# Patient Record
Sex: FEMALE | Race: BLACK OR AFRICAN AMERICAN | NOT HISPANIC OR LATINO | Employment: UNEMPLOYED | URBAN - METROPOLITAN AREA
[De-identification: names, ages, dates, MRNs, and addresses within clinical notes are randomized per-mention and may not be internally consistent; named-entity substitution may affect disease eponyms.]

---

## 2019-10-03 ENCOUNTER — LAB VISIT (OUTPATIENT)
Dept: LAB | Facility: HOSPITAL | Age: 49
End: 2019-10-03
Attending: INTERNAL MEDICINE
Payer: COMMERCIAL

## 2019-10-03 ENCOUNTER — OFFICE VISIT (OUTPATIENT)
Dept: PULMONOLOGY | Facility: CLINIC | Age: 49
End: 2019-10-03
Payer: COMMERCIAL

## 2019-10-03 VITALS
HEART RATE: 94 BPM | SYSTOLIC BLOOD PRESSURE: 160 MMHG | OXYGEN SATURATION: 91 % | HEIGHT: 65 IN | DIASTOLIC BLOOD PRESSURE: 88 MMHG | RESPIRATION RATE: 18 BRPM | BODY MASS INDEX: 39.47 KG/M2 | WEIGHT: 236.88 LBS

## 2019-10-03 DIAGNOSIS — R06.89 DYSPNEA AND RESPIRATORY ABNORMALITIES: Chronic | ICD-10-CM

## 2019-10-03 DIAGNOSIS — R06.00 DYSPNEA AND RESPIRATORY ABNORMALITIES: Primary | Chronic | ICD-10-CM

## 2019-10-03 DIAGNOSIS — G47.33 OSA (OBSTRUCTIVE SLEEP APNEA): Chronic | ICD-10-CM

## 2019-10-03 DIAGNOSIS — R06.00 DYSPNEA AND RESPIRATORY ABNORMALITIES: Chronic | ICD-10-CM

## 2019-10-03 DIAGNOSIS — R06.89 DYSPNEA AND RESPIRATORY ABNORMALITIES: Primary | Chronic | ICD-10-CM

## 2019-10-03 DIAGNOSIS — E66.01 CLASS 2 SEVERE OBESITY DUE TO EXCESS CALORIES WITH SERIOUS COMORBIDITY AND BODY MASS INDEX (BMI) OF 39.0 TO 39.9 IN ADULT: Chronic | ICD-10-CM

## 2019-10-03 DIAGNOSIS — R91.8 MASS OF LEFT LUNG: Chronic | ICD-10-CM

## 2019-10-03 LAB — ERYTHROCYTE [SEDIMENTATION RATE] IN BLOOD BY WESTERGREN METHOD: 5 MM/HR (ref 0–20)

## 2019-10-03 PROCEDURE — 99999 PR PBB SHADOW E&M-NEW PATIENT-LVL III: ICD-10-PCS | Mod: PBBFAC,,, | Performed by: INTERNAL MEDICINE

## 2019-10-03 PROCEDURE — 99205 PR OFFICE/OUTPT VISIT, NEW, LEVL V, 60-74 MIN: ICD-10-PCS | Mod: S$PBB,,, | Performed by: INTERNAL MEDICINE

## 2019-10-03 PROCEDURE — 85651 RBC SED RATE NONAUTOMATED: CPT

## 2019-10-03 PROCEDURE — 99205 OFFICE O/P NEW HI 60 MIN: CPT | Mod: S$PBB,,, | Performed by: INTERNAL MEDICINE

## 2019-10-03 PROCEDURE — 99203 OFFICE O/P NEW LOW 30 MIN: CPT | Mod: PBBFAC | Performed by: INTERNAL MEDICINE

## 2019-10-03 PROCEDURE — 99999 PR PBB SHADOW E&M-NEW PATIENT-LVL III: CPT | Mod: PBBFAC,,, | Performed by: INTERNAL MEDICINE

## 2019-10-03 PROCEDURE — 86140 C-REACTIVE PROTEIN: CPT

## 2019-10-03 PROCEDURE — 86038 ANTINUCLEAR ANTIBODIES: CPT

## 2019-10-03 PROCEDURE — 86255 FLUORESCENT ANTIBODY SCREEN: CPT

## 2019-10-03 RX ORDER — HYDRALAZINE HYDROCHLORIDE 10 MG/1
10 TABLET, FILM COATED ORAL DAILY
COMMUNITY

## 2019-10-03 RX ORDER — FERROUS SULFATE 325(65) MG
325 TABLET ORAL 3 TIMES DAILY
COMMUNITY

## 2019-10-03 RX ORDER — POTASSIUM CHLORIDE 20 MEQ/1
20 TABLET, EXTENDED RELEASE ORAL ONCE
COMMUNITY

## 2019-10-03 RX ORDER — INSULIN GLARGINE 100 [IU]/ML
15 INJECTION, SOLUTION SUBCUTANEOUS 2 TIMES DAILY
COMMUNITY

## 2019-10-03 RX ORDER — ALBUTEROL SULFATE 90 UG/1
2 AEROSOL, METERED RESPIRATORY (INHALATION) EVERY 6 HOURS PRN
COMMUNITY

## 2019-10-03 RX ORDER — ALBUTEROL SULFATE 0.83 MG/ML
2.5 SOLUTION RESPIRATORY (INHALATION) EVERY 6 HOURS PRN
COMMUNITY

## 2019-10-03 RX ORDER — AMLODIPINE BESYLATE 5 MG/1
5 TABLET ORAL DAILY
COMMUNITY

## 2019-10-03 RX ORDER — METOPROLOL SUCCINATE 100 MG/1
100 TABLET, EXTENDED RELEASE ORAL 2 TIMES DAILY
COMMUNITY

## 2019-10-03 RX ORDER — LORATADINE 10 MG/1
10 TABLET ORAL DAILY
COMMUNITY

## 2019-10-03 RX ORDER — METFORMIN HYDROCHLORIDE 850 MG/1
850 TABLET ORAL 2 TIMES DAILY WITH MEALS
COMMUNITY

## 2019-10-03 RX ORDER — FUROSEMIDE 40 MG/1
40 TABLET ORAL 2 TIMES DAILY
COMMUNITY

## 2019-10-03 RX ORDER — MONTELUKAST SODIUM 10 MG/1
10 TABLET ORAL NIGHTLY
COMMUNITY
End: 2020-01-14

## 2019-10-03 RX ORDER — FLUTICASONE FUROATE AND VILANTEROL 100; 25 UG/1; UG/1
1 POWDER RESPIRATORY (INHALATION) DAILY
COMMUNITY
End: 2020-01-14

## 2019-10-03 RX ORDER — TRAZODONE HYDROCHLORIDE 100 MG/1
100 TABLET ORAL NIGHTLY
COMMUNITY

## 2019-10-03 NOTE — PROGRESS NOTES
New patient    Subjective:      Patient ID: Nu Yañez is a 49 y.o. female.    Patient Active Problem List   Diagnosis    Dyspnea and respiratory abnormalities    MARLENA (obstructive sleep apnea)    Mass of left lung    Class 2 severe obesity due to excess calories with serious comorbidity and body mass index (BMI) of 39.0 to 39.9 in adult       Problem list has been reviewed.    she has been referred by Self, Werner for evaluation and management for   Chief Complaint   Patient presents with    Shortness of Breath       Chief Complaint: Shortness of Breath      HPI:      She has a 6 X 3 X 8 CM peripheral cyst in her left lung with associated adenopathy. LN biopsy 12/2018 was reportedly unremarkable. She is under the care of Dr. He ( PULMONOLOGY), and Dr. Figueroa ( SURGERY). She is here for a second opinion.      Patient reports difficulty breathing since last year.     Dyspnea Characteristics:   Exertional Dyspnea   Dyspnea Duration:  Chronic  Dyspnea Severity:  ESTELLE 3  Dyspnea Timing:  Exertional   Dyspnea Contributing Factors:  Exertional    Dyspnea Associated Symptoms:   Wheezing       Modified Estelle Dyspnea Scale      0  Nothing at all    0.5  Very, very slight (just noticeable)    1  Very slight    2  Slight    3  Moderate    4 Somewhat severe    5 Severe      6    7  Very severe    8    9   Very, very severe (almost maximal)  10  Maximal     She reports intermittent non productive cough. Patient denies recent sick contacts. Her current respiratory therapy regimen is ASTHMA, PROAIR, ACCUNEB  which provides  relief. She is adherent with her regimen.  Patient does not have new pets. Patient does not have a personal history of asthma but has a significant family history of asthma. Patient does have a history of environmental allergens. No recent change in breathing. Patient has not traveled recently. Patient does not have a history of smoking. Patient has had a previous chest x-ray. Patient has  had a PPD done.  PPD was Negative      Snoring / Sleep Apnea:   Patient has observed tossing/turning, snoring.   Patient reports non restful' sleep.  she denies morning headache.   shereports impairment of activity during wakefulness.  she denies day time napping   [place] sleepiness score was 10.  Neck circumference is 16.  she reports recent weight gain.  Mallampati score 4  Cardiovascular risk factors: diabetes, hypertension, CHF and obesity  Bed time is 2100  Wake time is 0400  Sleep onset is within  30 Minutes.  Sleep maintenance difficulties related to early morning awakening and frequent night time awakening  Wake after sleep onset occurs two times a night.  Nocturia occurs two times a night,   Sleep aids : Yes - TRAZODONE, MELATONIN  Dry mouth : No,   Sleep walking: No,   Sleep talking : No,   Sleep eating:No  Vivid Dreams : No,   Cataplexy : No,   Hypnogogic hallucinations:  No    COMORBIDITIES:  BP Readings from Last 3 Encounters:   10/03/19 (!) 160/88           Spring Questionnaire (validated MARLENA screening questionnaire)  Positive-- Snoring/apnea  Positive -- Fatigue    Body mass index is 39.42 kg/m².  (>25 is overweight, >30 is obese)  Blood Pressure = stage 1    (PreHTN 120-139/80-89, Stg1 140-159/90-99, Stg2 >160/>100)  Spring = Three of three MARLENA categories are positive (high risk is 2-3 positive categories)     [place] Sleepiness Scale   EPWORTH SLEEPINESS SCALE 10/3/2019   Sitting and reading 2   Watching TV 2   Sitting, inactive in a public place (e.g. a theatre or a meeting) 2   As a passenger in a car for an hour without a break 0   Lying down to rest in the afternoon when circumstances permit 2   Sitting and talking to someone 0   Sitting quietly after a lunch without alcohol 2   In a car, while stopped for a few minutes in traffic 0   Total score 10       Reference: Lidia CLAIRE. A new method for measuring daytime sleepiness: The [place]  Sleepiness Scale. Sleep 1991; 14(6):540-5.    STOP-Bang  Questionnaire (validated MARLENA screening questionnaire)  Negative unless checked off.  [x] Snoring    [x]  Tired/Fatigued/Sleepy  [] Obstruction (apneas/choking)  [x] Pressure (HTN)  [x] BMI >35  [] Age >50  [] Neck >40 cm  [] Gender male   STOP-Bang = 4 (low risk 0-2,high risk 3-8)    References:   STOP Questionnaire   A Tool to Screen Patients for Obstructive Sleep Apnea: LAURA RicardoC.P.C., SENTHIL Choi.B.B.S., Cathy Perry M.D.,Adeline Jj, Ph.D., TL HerndonB.B.S.,_ Stanley Bosch.,_ Yusuf Sparrow M.D., PINO Rasmussen.R.C.P.C.; Anesthesiology 2008; 108:812-21 Copyright © 2008, the American Society of Anesthesiologists, Inc. [employer]      Neck circumference 41 cm [?MARLENA risk if >43cm (17in) male or >41cm (15.5 in) female]    Sleep position Supine = rare    Non-supine = frequent  Mouth breathing during sleep - possibly     Previous Report Reviewed: lab reports, office notes and radiology reports     Past Medical History: The following portions of the patient's history were reviewed and updated as appropriate:   She  has a past surgical history that includes Tubal ligation.  Her family history is not on file.  She  reports that she has never smoked. She has never used smokeless tobacco. She reports that she drank alcohol. She reports that she does not use drugs.  She has a current medication list which includes the following prescription(s): albuterol, albuterol, amlodipine, ferrous sulfate, fluticasone furoate-vilanterol, furosemide, hydralazine, insulin glargine, loratadine, metformin, metoprolol succinate, montelukast, potassium chloride sa, and trazodone.  She has No Known Allergies..    Review of Systems   Constitutional: Positive for night sweats. Negative for chills and fatigue.   Respiratory: Positive for apnea, snoring, cough and somnolence.           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    Avocational Exposures:  none    Pet Exposures:  none      Objective:     Vitals:    10/03/19 1010   BP: (!) 160/88   Pulse: 94   Resp: 18   SpO2: (!) 91%   Weight: 107.5 kg (236 lb 14.2 oz)   Height: 5' 5" (1.651 m)     Body mass index is 39.42 kg/m².    Physical Exam   Constitutional: She is oriented to person, place, and time. She appears well-developed and well-nourished.   HENT:   Head: Normocephalic and atraumatic.   Eyes: Pupils are equal, round, and reactive to light. EOM are normal.   Neck: Normal range of motion. Neck supple.   Cardiovascular: Normal rate and regular rhythm.   Pulmonary/Chest: Effort normal and breath sounds normal.   Abdominal: Soft. Bowel sounds are normal.   Musculoskeletal: Normal range of motion. She exhibits no edema.   Neurological: She is alert and oriented to person, place, and time.   Skin: Skin is warm and dry. Capillary refill takes less than 2 seconds.   Psychiatric: She has a normal mood and affect.   Nursing note and vitals reviewed.      Personal Diagnostic Review    MRI chest with and without contrast : 09/10/19:  There is a persistent well marginated "mass" at the posteromedial left chest. The lesion is located along the pleural surface immediately lateral to the distal aortic arch and proximal descending thoracic aorta and immediately posterior to the left distal pulmonary artery. The well marginated lesion is homogeneously hyperintense on T1 and T2-weighted imaging and measures 6.8 x 3 x 8.2 cm in greatest AP, TV, and CC dimensions. This lesion is not non-enhancing and appreciably changed since the prior chest CT exam, consistent with a benign fluid collection with internal proteinaceous fluid and/or less likely chronic hemorrhage. There is no peripheral rind of enhancement to suggest superimposed inflammation or infection. The previously demonstrated small right pleural effusion has resolved. There is no left pleural " effusion. There is persistent mediastinal and more mild bilateral hilar adenopathy. A right upper paratracheal node measures 3.2 x 1.9 cm on axial T1 post contrast image 35, series 11. There are several additional mildly enlarged bilateral paratracheal lymph nodes and a mildly enlarged subcarinal lymph node. Mild cardiomegaly without pericardial effusion. There is enlargement of the main pulmonary artery is again demonstrated, measuring approximately 35 mm in diameter and suggesting pulmonary arterial hypertension. Obesity without acute chest wall abnormality. Small and shotty bilateral axillary nodes with no axillary adenopathy or chest wall mass. There is partially visualized diffuse hepatic steatosis without acute upper abdominal pathology identified. The spleen is normal in size. No significant skeletal abnormality.         Assessment /Plan:     Discussed diagnosis, its evaluation, treatment and usual course. All questions answered.    Problem List Items Addressed This Visit        Pulmonary    Mass of left lung    Current Assessment & Plan     6 X 3 X 8 fluid filled cystic mass.  CT chest non contrast to evaluate.             Other    Class 2 severe obesity due to excess calories with serious comorbidity and body mass index (BMI) of 39.0 to 39.9 in adult (Chronic)    Current Assessment & Plan     General weight loss/lifestyle modification strategies discussed (elicit support from others; identify saboteurs; non-food rewards, etc).  Behavioral treatment: Slim Fast.  Diet interventions: low calorie (1000 kCal/d) deficit diet.  Informal exercise measures discussed, e.g. taking stairs instead of elevator.  Regular aerobic exercise program discussed.         Dyspnea and respiratory abnormalities - Primary    Current Assessment & Plan     Etiology unclear.  Multifactorial etiology suspected.  Likely contributors to etiology (checked)    [x] Pulmonary airway disease    [x]  Pulmonary parenchymal disease  [x] Pulmonary  vascular disease   [x] Pleural disease  [x] Pulmonary vasculitis  [x] Hypoventilation ( chest wall deformity, neuromuscular disease, obesity etc)  [] Anemia  [x] Thyroid disease.  [x] Cardiac illess  [x]         Sleep disorder    EVALUATION:  [x]        Complete PFT with bronchodilator.  []        Bronchial challenge with methacholine.   [x]        Stress test, pulmonary.  [x]        PULM - Arterial Blood Gases  []        Chest X Ray  [x]        CT scan of chest.   [x]        JORGE  [x]        Sedimentation rate  [x]        C-reactive protein  [x]        Anti-neutrophilic cytoplasmic antibody          PLAN:  Discussed diagnosis, its evaluation, treatment and usual course.  All questions answered.        Call if shortness of breath worsens, blood in sputum, change in character of cough, development of fever or chills, inability to maintain nutrition and hydration.     Re evaluate in four weeks with results.           Relevant Orders    JORGE Screen w/Reflex    C-reactive protein    Sedimentation rate    Anti-neutrophilic cytoplasmic antibody    Complete PFT with bronchodilator    PULM - Arterial Blood Gases--in addition to PFT only    Pulmonary stress test    CT Chest Without Contrast    MARLENA (obstructive sleep apnea)    Current Assessment & Plan     My recommendation at this point would be to set up a [facility].  We have discussed weight loss and how this may improve her situation.  We have discussed behavioral modifications, as well.  After her study, she  could certainly try a CPAP.                    TIME SPENT WITH PATIENT: Time spent: 60 minutes in face to face  discussion concerning diagnosis, prognosis, review of lab and test results, benefits of treatment as well as management of disease, counseling of patient and coordination of care between various health  care providers . Greater than half the time spent was used for coordination of care and counseling of patient.      Follow up in about 1 month (around 11/3/2019) for Lung Mass, Obesity, Shortness of breath.

## 2019-10-03 NOTE — ASSESSMENT & PLAN NOTE
Etiology unclear.  Multifactorial etiology suspected.  Likely contributors to etiology (checked)    [x] Pulmonary airway disease    [x]  Pulmonary parenchymal disease  [x] Pulmonary vascular disease   [x] Pleural disease  [x] Pulmonary vasculitis  [x] Hypoventilation ( chest wall deformity, neuromuscular disease, obesity etc)  [] Anemia  [x] Thyroid disease.  [x] Cardiac illess  [x]         Sleep disorder    EVALUATION:  [x]        Complete PFT with bronchodilator.  []        Bronchial challenge with methacholine.   [x]        Stress test, pulmonary.  [x]        PULM - Arterial Blood Gases  []        Chest X Ray  [x]        CT scan of chest.   [x]        JORGE  [x]        Sedimentation rate  [x]        C-reactive protein  [x]        Anti-neutrophilic cytoplasmic antibody          PLAN:  Discussed diagnosis, its evaluation, treatment and usual course.  All questions answered.        Call if shortness of breath worsens, blood in sputum, change in character of cough, development of fever or chills, inability to maintain nutrition and hydration.     Re evaluate in four weeks with results.

## 2019-10-03 NOTE — PATIENT INSTRUCTIONS
Lung Anatomy  Your lungs take air in to give your body oxygen, which the body needs to work. Your lungs, like all the tissues in your body, are made up of billions of tiny specialized cells. Old lung cells die and are replaced by new, identical lung cells. This natural process helps ensure healthy lungs.    Date Last Reviewed: 11/1/2016  © 9802-6640 [employer]. [address] [place] [address], [place] [address]. All rights reserved. This information is not intended as a substitute for professional medical care. Always follow your healthcare professional's instructions.

## 2019-10-04 LAB
ANA SER QL IF: NORMAL
CRP SERPL-MCNC: 9.2 MG/L (ref 0–8.2)

## 2019-10-07 LAB
ANCA AB TITR SER IF: NORMAL TITER
P-ANCA TITR SER IF: NORMAL TITER

## 2019-10-15 ENCOUNTER — TELEPHONE (OUTPATIENT)
Dept: PULMONOLOGY | Facility: CLINIC | Age: 49
End: 2019-10-15

## 2019-10-15 NOTE — TELEPHONE ENCOUNTER
----- Message from Nadeen Vadim sent at 10/15/2019 10:27 AM CDT -----  Contact: pt  States she [facility]. Fax# [phone], attn [employer]. She is there now and she needs to get it faxed as soon as possible. Please call pt at [phone]. Thank you

## 2019-10-24 ENCOUNTER — TELEPHONE (OUTPATIENT)
Dept: PULMONOLOGY | Facility: CLINIC | Age: 49
End: 2019-10-24

## 2019-10-24 NOTE — TELEPHONE ENCOUNTER
----- Message from Luma Hamilton RRT sent at 10/24/2019  8:25 AM CDT -----  Contact: self  Forwarding to you to reschedule bc patient has CT Scan to reschedule also.      ----- Message -----  From: Julia Hwang  Sent: 10/23/2019   5:21 PM CDT  To: [facility] Clinical Staff    Pt called to speak with dept to jason appt.          Pt callback number [phone]

## 2019-11-26 ENCOUNTER — TELEPHONE (OUTPATIENT)
Dept: PULMONOLOGY | Facility: CLINIC | Age: 49
End: 2019-11-26

## 2019-11-26 NOTE — TELEPHONE ENCOUNTER
----- Message from Karmen Sawyer sent at 11/26/2019  1:47 PM CST -----  Contact: PATIENT  CALLING CONCERNING RESCHEDULING HER APPOINTMENTS. PLEASE CALL PATIENT @ [phone]. THANKS, LEANDRO

## 2020-01-14 ENCOUNTER — HOSPITAL ENCOUNTER (OUTPATIENT)
Dept: RADIOLOGY | Facility: HOSPITAL | Age: 50
Discharge: HOME | End: 2020-01-14
Attending: INTERNAL MEDICINE
Payer: COMMERCIAL

## 2020-01-14 ENCOUNTER — OFFICE VISIT (OUTPATIENT)
Dept: PULMONOLOGY | Facility: CLINIC | Age: 50
End: 2020-01-14
Payer: COMMERCIAL

## 2020-01-14 ENCOUNTER — CLINICAL SUPPORT (OUTPATIENT)
Dept: PULMONOLOGY | Facility: CLINIC | Age: 50
End: 2020-01-14
Payer: COMMERCIAL

## 2020-01-14 VITALS
HEART RATE: 70 BPM | WEIGHT: 242 LBS | HEIGHT: 65 IN | OXYGEN SATURATION: 100 % | RESPIRATION RATE: 18 BRPM | DIASTOLIC BLOOD PRESSURE: 80 MMHG | SYSTOLIC BLOOD PRESSURE: 142 MMHG | BODY MASS INDEX: 40.32 KG/M2

## 2020-01-14 VITALS — BODY MASS INDEX: 40.4 KG/M2 | HEIGHT: 65 IN | WEIGHT: 242.5 LBS

## 2020-01-14 DIAGNOSIS — E66.01 CLASS 2 SEVERE OBESITY DUE TO EXCESS CALORIES WITH SERIOUS COMORBIDITY AND BODY MASS INDEX (BMI) OF 39.0 TO 39.9 IN ADULT: Chronic | ICD-10-CM

## 2020-01-14 DIAGNOSIS — R06.89 DYSPNEA AND RESPIRATORY ABNORMALITIES: Chronic | ICD-10-CM

## 2020-01-14 DIAGNOSIS — R06.00 DYSPNEA AND RESPIRATORY ABNORMALITIES: Chronic | ICD-10-CM

## 2020-01-14 DIAGNOSIS — G47.33 OSA (OBSTRUCTIVE SLEEP APNEA): Chronic | ICD-10-CM

## 2020-01-14 DIAGNOSIS — R91.8 MASS OF LEFT LUNG: Primary | Chronic | ICD-10-CM

## 2020-01-14 DIAGNOSIS — R06.89 DYSPNEA AND RESPIRATORY ABNORMALITIES: ICD-10-CM

## 2020-01-14 DIAGNOSIS — R06.00 DYSPNEA AND RESPIRATORY ABNORMALITIES: ICD-10-CM

## 2020-01-14 DIAGNOSIS — J96.11 CHRONIC RESPIRATORY FAILURE WITH HYPOXIA: Chronic | ICD-10-CM

## 2020-01-14 DIAGNOSIS — J98.4 RESTRICTIVE LUNG DISEASE: ICD-10-CM

## 2020-01-14 LAB
ALLENS TEST: ABNORMAL
BRPFT: ABNORMAL
DELSYS: ABNORMAL
DLCO ADJ PRE: 10.59 ML/(MIN*MMHG) (ref 19.07–30.54)
DLCO SINGLE BREATH LLN: 19.07
DLCO SINGLE BREATH PRE REF: 42.7 %
DLCO SINGLE BREATH REF: 24.8
DLCOC SBVA LLN: 3.4
DLCOC SBVA PRE REF: 108.5 %
DLCOC SBVA REF: 4.86
DLCOC SINGLE BREATH LLN: 19.07
DLCOC SINGLE BREATH PRE REF: 42.7 %
DLCOC SINGLE BREATH REF: 24.8
DLCOVA LLN: 3.4
DLCOVA PRE REF: 108.5 %
DLCOVA PRE: 5.28 ML/(MIN*MMHG*L) (ref 3.4–6.33)
DLCOVA REF: 4.86
DLVAADJ PRE: 5.28 ML/(MIN*MMHG*L) (ref 3.4–6.33)
ERV LLN: 0.96
ERV PRE REF: 14.9 %
ERV REF: 0.96
FEF 25 75 CHG: -14 %
FEF 25 75 LLN: 1.18
FEF 25 75 POST REF: 63.8 %
FEF 25 75 PRE REF: 74.2 %
FEF 25 75 REF: 2.44
FET100 CHG: -3.9 %
FEV1 CHG: -6.5 %
FEV1 FVC CHG: 0.1 %
FEV1 FVC LLN: 70
FEV1 FVC POST REF: 106.7 %
FEV1 FVC PRE REF: 106.7 %
FEV1 FVC REF: 81
FEV1 LLN: 1.85
FEV1 POST REF: 44.5 %
FEV1 PRE REF: 47.6 %
FEV1 REF: 2.45
FIO2: 0.21
FRCPLETH LLN: 1.92
FRCPLETH PREREF: 43.2 %
FRCPLETH REF: 2.75
FVC CHG: -6.6 %
FVC LLN: 2.32
FVC POST REF: 41.5 %
FVC PRE REF: 44.4 %
FVC REF: 3.04
HCO3 UR-SCNC: 23.6 MMOL/L (ref 24–28)
IVC PRE: 1.31 L (ref 2.32–3.77)
IVC SINGLE BREATH LLN: 2.32
IVC SINGLE BREATH PRE REF: 43 %
IVC SINGLE BREATH REF: 3.04
MODE: ABNORMAL
MVV LLN: 91
MVV PRE REF: 47.8 %
MVV REF: 107
PCO2 BLDA: 34.4 MMHG (ref 35–45)
PEF CHG: 10 %
PEF LLN: 4.28
PEF POST REF: 91.6 %
PEF PRE REF: 83.3 %
PEF REF: 6.35
PH SMN: 7.44 [PH] (ref 7.35–7.45)
PO2 BLDA: 61 MMHG (ref 80–100)
POC BE: -1 MMOL/L
POC SATURATED O2: 92 % (ref 95–100)
POST FEF 25 75: 1.56 L/S (ref 1.18–3.7)
POST FET 100: 7.04 SEC
POST FEV1 FVC: 86.23 % (ref 69.94–91.63)
POST FEV1: 1.09 L (ref 1.85–3.05)
POST FVC: 1.26 L (ref 2.32–3.77)
POST PEF: 5.82 L/S (ref 4.28–8.41)
PRE DLCO: 10.59 ML/(MIN*MMHG) (ref 19.07–30.54)
PRE ERV: 0.14 L (ref 0.96–0.96)
PRE FEF 25 75: 1.81 L/S (ref 1.18–3.7)
PRE FET 100: 7.33 SEC
PRE FEV1 FVC: 86.18 % (ref 69.94–91.63)
PRE FEV1: 1.17 L (ref 1.85–3.05)
PRE FRC PL: 1.19 L
PRE FVC: 1.35 L (ref 2.32–3.77)
PRE MVV: 51 L/MIN (ref 90.69–122.7)
PRE PEF: 5.29 L/S (ref 4.28–8.41)
PRE RV: 1 L (ref 1.21–2.36)
PRE TLC: 2.35 L (ref 4.11–6.09)
RAW LLN: 3.06
RAW PRE REF: 92.7 %
RAW PRE: 2.84 CMH2O*S/L (ref 3.06–3.06)
RAW REF: 3.06
RV LLN: 1.21
RV PRE REF: 56.1 %
RV REF: 1.79
RVTLC LLN: 26
RVTLC PRE REF: 118.3 %
RVTLC PRE: 42.55 % (ref 26.37–45.55)
RVTLC REF: 36
SAMPLE: ABNORMAL
SITE: ABNORMAL
TLC LLN: 4.11
TLC PRE REF: 46.2 %
TLC REF: 5.1
VA PRE: 2.01 L (ref 4.95–4.95)
VA SINGLE BREATH LLN: 4.95
VA SINGLE BREATH PRE REF: 40.5 %
VA SINGLE BREATH REF: 4.95
VC LLN: 2.32
VC PRE REF: 44.4 %
VC PRE: 1.35 L (ref 2.32–3.77)
VC REF: 3.04
VTGRAWPRE: 1.71 L

## 2020-01-14 PROCEDURE — 71250 CT THORAX DX C-: CPT | Mod: TC

## 2020-01-14 PROCEDURE — 99999 PR PBB SHADOW E&M-EST. PATIENT-LVL III: CPT | Mod: PBBFAC,,, | Performed by: INTERNAL MEDICINE

## 2020-01-14 PROCEDURE — 99213 OFFICE O/P EST LOW 20 MIN: CPT | Mod: PBBFAC,25,27 | Performed by: INTERNAL MEDICINE

## 2020-01-14 PROCEDURE — 94729 DIFFUSING CAPACITY: CPT | Mod: 26,S$PBB,, | Performed by: INTERNAL MEDICINE

## 2020-01-14 PROCEDURE — 94060 EVALUATION OF WHEEZING: CPT | Mod: PBBFAC

## 2020-01-14 PROCEDURE — 94060 EVALUATION OF WHEEZING: CPT | Mod: 26,S$PBB,, | Performed by: INTERNAL MEDICINE

## 2020-01-14 PROCEDURE — 94618 PULMONARY STRESS TESTING: CPT | Mod: PBBFAC

## 2020-01-14 PROCEDURE — 94618 PULMONARY STRESS TESTING: ICD-10-PCS | Mod: 26,S$PBB,, | Performed by: INTERNAL MEDICINE

## 2020-01-14 PROCEDURE — 99215 PR OFFICE/OUTPT VISIT, EST, LEVL V, 40-54 MIN: ICD-10-PCS | Mod: 25,S$PBB,, | Performed by: INTERNAL MEDICINE

## 2020-01-14 PROCEDURE — 36600 WITHDRAWAL OF ARTERIAL BLOOD: CPT | Mod: PBBFAC

## 2020-01-14 PROCEDURE — 94618 PULMONARY STRESS TESTING: CPT | Mod: 26,S$PBB,, | Performed by: INTERNAL MEDICINE

## 2020-01-14 PROCEDURE — 94726 PULM FUNCT TST PLETHYSMOGRAP: ICD-10-PCS | Mod: 26,S$PBB,, | Performed by: INTERNAL MEDICINE

## 2020-01-14 PROCEDURE — 99999 PR PBB SHADOW E&M-EST. PATIENT-LVL I: ICD-10-PCS | Mod: PBBFAC,,,

## 2020-01-14 PROCEDURE — 82803 BLOOD GASES ANY COMBINATION: CPT | Mod: PBBFAC

## 2020-01-14 PROCEDURE — 94726 PLETHYSMOGRAPHY LUNG VOLUMES: CPT | Mod: 26,S$PBB,, | Performed by: INTERNAL MEDICINE

## 2020-01-14 PROCEDURE — 99211 OFF/OP EST MAY X REQ PHY/QHP: CPT | Mod: PBBFAC,25

## 2020-01-14 PROCEDURE — 99999 PR PBB SHADOW E&M-EST. PATIENT-LVL III: ICD-10-PCS | Mod: PBBFAC,,, | Performed by: INTERNAL MEDICINE

## 2020-01-14 PROCEDURE — 99999 PR PBB SHADOW E&M-EST. PATIENT-LVL I: CPT | Mod: PBBFAC,,,

## 2020-01-14 PROCEDURE — 94729 DIFFUSING CAPACITY: CPT | Mod: PBBFAC

## 2020-01-14 PROCEDURE — 94726 PLETHYSMOGRAPHY LUNG VOLUMES: CPT | Mod: PBBFAC

## 2020-01-14 PROCEDURE — 36600 WITHDRAWAL OF ARTERIAL BLOOD: CPT | Mod: 59,S$PBB,, | Performed by: INTERNAL MEDICINE

## 2020-01-14 PROCEDURE — 94060 PR EVAL OF BRONCHOSPASM: ICD-10-PCS | Mod: 26,S$PBB,, | Performed by: INTERNAL MEDICINE

## 2020-01-14 PROCEDURE — 99215 OFFICE O/P EST HI 40 MIN: CPT | Mod: 25,S$PBB,, | Performed by: INTERNAL MEDICINE

## 2020-01-14 PROCEDURE — 36600 PR WITHDRAWAL OF ARTERIAL BLOOD: ICD-10-PCS | Mod: 59,S$PBB,, | Performed by: INTERNAL MEDICINE

## 2020-01-14 PROCEDURE — 99211 OFF/OP EST MAY X REQ PHY/QHP: CPT | Mod: PBBFAC,25,27

## 2020-01-14 PROCEDURE — 94729 PR C02/MEMBANE DIFFUSE CAPACITY: ICD-10-PCS | Mod: 26,S$PBB,, | Performed by: INTERNAL MEDICINE

## 2020-01-14 NOTE — PROGRESS NOTES
Patient does not want to take overnight today. Patient lives to far and can not return it tomorrow.

## 2020-01-14 NOTE — ASSESSMENT & PLAN NOTE
Start oxygen supplementation at 4 L /min with exertion.     ONSAT on room air.   2D ECHO with color flow doppler and bubble contrast.

## 2020-01-14 NOTE — PROCEDURES
"[facility]  Six Minute Walk     SUMMARY     Ordering Provider: Dr. Crump   Interpreting Provider: Dr. Crump  Performing nurse/tech/RT: DEBORAH Reeves  Diagnosis: (Dyspnea and Respiratory abnormalities)  Height: 5' 5" (165.1 cm)  Weight: 110 kg (242 lb 8.1 oz)  BMI (Calculated): 40.4   Patient Race:             Phase Oxygen Assessment Supplemental O2 Heart   Rate Blood Pressure Estelle Dyspnea Scale Rating   Resting 95 % Room Air 76 bpm 178/88 0   Exercise        Minute        1 90 % Room Air 87 bpm     2 83 % Room Air 87 bpm     3 92 % 2 L/M 86 bpm     4 90 % 2 L/M 85 bpm     5 86 % 2 L/M 88 bpm     6  85 % 3 L/M 88 bpm 168/83 2   Recovery        Minute        1 89 % 4 L/M 84 bpm     2 96 % 4 L/M 76 bpm     3 100 % 4 L/M 73 bpm     4 100 % 4 L/M 70 bpm 160/79 1     Six Minute Walk Summary  6MWT Status: completed without stopping  Patient Reported: No complaints     Interpretation:  Did the patient stop or pause?: No                                         Total Time Walked (Calculated): 360 seconds  Final Partial Lap Distance (feet): 0 feet  Total Distance Meters (Calculated): 182.88 meters  Predicted Distance Meters (Calculated): 474.46 meters  Percentage of Predicted (Calculated): 38.54  Peak VO2 (Calculated): 9.47  Mets: 2.71  Has The Patient Had a Previous Six Minute Walk Test?: No       Previous 6MWT Results  Has The Patient Had a Previous Six Minute Walk Test?: No        PHYSICIAN INTERPRETATION:      Six minute walk distance is 182.88 / 474.46 meters (38.54 % predicted) with very light dyspnea.   Peak VO2 during walking was 9.47  Ml/min/kg [ 2.71 METS].   During exercise, there was significant desaturation while breathing room air to 83%.   Oxygen saturation improved to 100% with oxygen supplementation at 4  L /min.  Both blood pressure and heart rate remained stable with walking.  Hypertension was present prior to exercise.  This may represent an abnormal cardiovascular " response to exercise.  The patient did not report non-pulmonary symptoms during exercise.  Severe exercise impairment is likely due to desaturation and cardiovascular causes.  The patient did complete the study, walking 360 seconds of the 360 second test.  The patient may benefit from using supplemental oxygen.  No previous study performed.  Based upon age and body mass index, exercise capacity is less than predicted.

## 2020-01-14 NOTE — ASSESSMENT & PLAN NOTE
Etiology unclear.  Multifactorial etiology suspected.  Likely contributors to etiology (checked)    [] Pulmonary airway disease    []  Pulmonary parenchymal disease ( sarcoidosis)  [] Pulmonary vascular disease   [] Pleural disease  [] Pulmonary vasculitis  [x] Hypoventilation ( chest wall deformity, neuromuscular disease, obesity etc)  [] Anemia  [] Thyroid disease.  [x] Cardiac illess  []         Sleep disorder  [x]         Body Habitus (Obesity)    CRLD ROS: no significant ongoing wheezing or shortness of breath.   New concerns: None.   Exam: appears well, vitals normal, no respiratory distress, acyanotic, normal RR.   Assessment:  CRLD needs further observation.   Plan: Continue PROAIR, PROVENTIL. Stop BREO, and SINGULAR.

## 2020-01-14 NOTE — ASSESSMENT & PLAN NOTE
6cm X 3cm X 8cm fluid filled cystic mass: Etiology unclear.  CT chest non contrast reveals associated extensive mediastinal lymphadenopathy.     NM PET scan     Ambulatory referral to CT surgery.

## 2020-01-14 NOTE — PROGRESS NOTES
Subjective:      Established patient    Patient ID: Nu Yañez is a 49 y.o. female.  Patient Active Problem List   Diagnosis    MARLENA (obstructive sleep apnea)    Mass of left lung    Class 2 severe obesity due to excess calories with serious comorbidity and body mass index (BMI) of 39.0 to 39.9 in adult    Restrictive lung disease    Chronic respiratory failure with hypoxia       Problem list has been reviewed.    Chief Complaint: Shortness of Breath and restrictive lung disease      HPI    CT Chest, PFT, 6 MWT and ABG reviewed with patient who expressed and voiced understanding.   All questions were answered to the patients satisfaction.    She has a 6 X 3 X 8 CM peripheral cyst in her left lung with associated adenopathy. LN biopsy 12/2018 was reportedly unremarkable. She is under the care of Dr. He ( PULMONOLOGY), and Dr. Figueroa ( SURGERY).     Patients reports STABLE NYHA II dyspnea    The patient does not have currently have symptoms of  an exacerbation.        Her current respiratory therapy regimen is PROAIR, PROVENTIL, BREO and SINGULAIR which provides relief. She is  adherent with her regimen. No recent change in breathing.      A full  review of systems, past , family  and social histories was performed except as mentioned in the note above, these are non contributory to the main issues discussed today.       Previous Report Reviewed: lab reports, office notes and radiology reports     The following portions of the patient's history were reviewed and updated as appropriate: She  has a past medical history of Diabetes and Hypertension.  She  has a past surgical history that includes Tubal ligation.  Her family history is not on file.  She  reports that she has never smoked. She has never used smokeless tobacco. She reports that she drank alcohol. She reports that she does not use drugs.  She has a current medication list which includes the following prescription(s): albuterol, albuterol,  "amlodipine, ferrous sulfate, furosemide, hydralazine, insulin glargine, loratadine, metformin, metoprolol succinate, potassium chloride sa, and trazodone.  She has No Known Allergies..    Review of Systems   Constitutional: Positive for night sweats. Negative for chills and fatigue.   Respiratory: Positive for apnea, snoring, cough and somnolence.         Objective:     BP (!) 142/80   Pulse 70   Resp 18   Ht 5' 5" (1.651 m)   Wt 109.8 kg (242 lb)   SpO2 100% Comment: 4 L  BMI 40.27 kg/m²   Body mass index is 40.27 kg/m².     Physical Exam   Constitutional: She is oriented to person, place, and time. She appears well-developed and well-nourished.   HENT:   Head: Normocephalic and atraumatic.   Eyes: Pupils are equal, round, and reactive to light. EOM are normal.   Neck: Normal range of motion. Neck supple.   Cardiovascular: Normal rate and regular rhythm.   Pulmonary/Chest: Effort normal and breath sounds normal.   Abdominal: Soft. Bowel sounds are normal.   Musculoskeletal: Normal range of motion. She exhibits no edema.   Neurological: She is alert and oriented to person, place, and time.   Skin: Skin is warm and dry. Capillary refill takes less than 2 seconds.   Psychiatric: She has a normal mood and affect.   Nursing note and vitals reviewed.      Personal Diagnostic Review     ABG 20  1115   PH 7.443   PCO2 34.4*   PO2 61*   HCO3 23.6*   POCSATURATED 92*   BE -1      Patient is alkalemic.  Primary respiratory alkalosis.  Secondary metabolic acidosis.  Moderate hypoxemia. A-a gradient (alveolar-arterial gradient; AaG) elevated: [ 47.5 mmHg ]    Pulmonary function tests:20: FEV1:1.17  (47.6 % predicted), FVC:  1.35 (44.4 % predicted), FEV1/FVC:  86, T.36 (46.2 % predicted), RV/TLVC: 43 (118.3 % predicted), DLCO: 10.59 (42.7 % predicted)  Baseline spirometry reveals normal airflow. Airflow is not clearly improved after bronchodilator. Clinical improvement following bronchodilator therapy may " occur in the absence of spirometric improvement. Static lung volumes reveal sever restriction. Unadjusted diffusion capcity is moderately reduced. This interpretation of diffusing capacity does not take into account the patient's hemoglobin level (Unavailable at the time of testing - hemoglobin assumed to be normal). Diffsuion capcity is normal when adjusted for alveolar volume. Correlate clinically.     Six Minute Walk Test: 01/14.20:   Six minute walk distance is 182.88 / 474.46 meters (38.54 % predicted) with very light dyspnea.   Peak VO2 during walking was 9.47  Ml/min/kg [ 2.71 METS].   During exercise, there was significant desaturation while breathing room air to 83%.   Oxygen saturation improved to 100% with oxygen supplementation at 4  L /min.  Both blood pressure and heart rate remained stable with walking.  Hypertension was present prior to exercise.  This may represent an abnormal cardiovascular response to exercise.  The patient did not report non-pulmonary symptoms during exercise.  Severe exercise impairment is likely due to desaturation and cardiovascular causes.  The patient did complete the study, walking 360 seconds of the 360 second test.  The patient may benefit from using supplemental oxygen.  No previous study performed.  Based upon age and body mass index, exercise capacity is less than predicted.    CT of chest performed on 01/14/20 without contrast     Base of Neck: No significant abnormality.    Thoracic soft tissues: Right axillary 4 x 1.8 cm lymph node.    Aorta: Left-sided aortic arch.  No aneurysm and no significant atherosclerosis    Heart: Normal size. No effusion.    Pulmonary vasculature: Pulmonary arteries distribute normally.    Catherine/Mediastinum: Extensive mediastinal lymphadenopathy for example right paratracheal 34 x 31 mm lymph node, subcarinal 35 x 19 mm lymph node.  65 x 41 mm posteromedial left upper lobe lung mass abutting the pleura.    Airways: Mild-to-moderate  "cylindrical bronchiectasis.    Lungs/Pleura: Scattered air trapping suggesting small airway inflammation.    Esophagus: Normal.    Upper Abdomen: No abnormality of the partially imaged upper abdomen.    Bones: No acute fracture. No suspicious lytic or sclerotic lesions.    MRI chest with and without contrast : 09/10/19:  There is a persistent well marginated "mass" at the posteromedial left chest. The lesion is located along the pleural surface immediately lateral to the distal aortic arch and proximal descending thoracic aorta and immediately posterior to the left distal pulmonary artery. The well marginated lesion is homogeneously hyperintense on T1 and T2-weighted imaging and measures 6.8 x 3 x 8.2 cm in greatest AP, TV, and CC dimensions. This lesion is not non-enhancing and appreciably changed since the prior chest CT exam, consistent with a benign fluid collection with internal proteinaceous fluid and/or less likely chronic hemorrhage. There is no peripheral rind of enhancement to suggest superimposed inflammation or infection. The previously demonstrated small right pleural effusion has resolved. There is no left pleural effusion. There is persistent mediastinal and more mild bilateral hilar adenopathy. A right upper paratracheal node measures 3.2 x 1.9 cm on axial T1 post contrast image 35, series 11. There are several additional mildly enlarged bilateral paratracheal lymph nodes and a mildly enlarged subcarinal lymph node. Mild cardiomegaly without pericardial effusion. There is enlargement of the main pulmonary artery is again demonstrated, measuring approximately 35 mm in diameter and suggesting pulmonary arterial hypertension. Obesity without acute chest wall abnormality. Small and shotty bilateral axillary nodes with no axillary adenopathy or chest wall mass. There is partially visualized diffuse hepatic steatosis without acute upper abdominal pathology identified. The spleen is normal in size. No " significant skeletal abnormality.     Laboratory Data:     Cytoplasmic Neutrophilic Ab <1:20 Titer <1:20      Perinuclear (P-ANCA) <1:20 Titer <1:20      Sed Rate 0 - 20 mm/Hr 5      CRP 0.0 - 8.2 mg/L 9.2High      JORGE Screen Negative <1:160 Negative <1:160              Assessment / Plan:       Discussed diagnosis, its evaluation, treatment and usual course. All questions answered.    Problem List Items Addressed This Visit        Pulmonary    Chronic respiratory failure with hypoxia (Chronic)    Current Assessment & Plan     Start oxygen supplementation at 4 L /min with exertion.     ONSAT on room air.   2D ECHO with color flow doppler and bubble contrast.          Relevant Orders    2D echo with color flow doppler and bubble contrast    Pulse oximetry overnight    OXYGEN FOR HOME USE    Mass of left lung - Primary    Current Assessment & Plan     6cm X 3cm X 8cm fluid filled cystic mass: Etiology unclear.  CT chest non contrast reveals associated extensive mediastinal lymphadenopathy.     NM PET scan     Ambulatory referral to CT surgery.         Relevant Orders    Ambulatory consult to Thoracic Surgery\    NM PET CT Routine Skull to Mid Thigh    Restrictive lung disease    Current Assessment & Plan     Etiology unclear.  Multifactorial etiology suspected.  Likely contributors to etiology (checked)    [] Pulmonary airway disease    []  Pulmonary parenchymal disease ( sarcoidosis)  [] Pulmonary vascular disease   [] Pleural disease  [] Pulmonary vasculitis  [x] Hypoventilation ( chest wall deformity, neuromuscular disease, obesity etc)  [] Anemia  [] Thyroid disease.  [x] Cardiac illess  []         Sleep disorder  [x]         Body Habitus (Obesity)    CRLD ROS: no significant ongoing wheezing or shortness of breath.   New concerns: None.   Exam: appears well, vitals normal, no respiratory distress, acyanotic, normal RR.   Assessment:  CRLD needs further observation.   Plan: Continue PROAIR, PROVENTIL. Stop BREO,  and SINGULAR.              Other    Class 2 severe obesity due to excess calories with serious comorbidity and body mass index (BMI) of 39.0 to 39.9 in adult (Chronic)    Current Assessment & Plan     General weight loss/lifestyle modification strategies discussed (elicit support from others; identify saboteurs; non-food rewards, etc).  Behavioral treatment: Slim Fast.  Diet interventions: low calorie (1000 kCal/d) deficit diet.  Informal exercise measures discussed, e.g. taking stairs instead of elevator.  Regular aerobic exercise program discussed.         MARLENA (obstructive sleep apnea)    Current Assessment & Plan     My recommendation at this point would be to set up a [facility].  We have discussed weight loss and how this may improve her situation.  We have discussed behavioral modifications, as well.  After her study, she  could certainly try a CPAP.          Relevant Orders    Polysomnogram (CPAP will be added if patient meets diagnostic criteria.)            TIME SPENT WITH PATIENT: Time spent: 40 minutes in face to face  discussion concerning diagnosis, prognosis, review of lab and test results, benefits of treatment as well as management of disease, counseling of patient and coordination of care between various health  care providers . Greater than half the time spent was used for coordination of care and counseling of patient.       Follow up in about 1 month (around 2/14/2020) for Lung Mass, MARLENA, Morbid Obesity, Restrictive Lung Disease.

## 2020-01-14 NOTE — PATIENT INSTRUCTIONS
Chronic Lung Disease: If Oxygen Is Prescribed   Supplemental oxygen is prescribed if tests show that the level of oxygen in your blood is too low. If the level stays too low for too long, serious problems can develop in many parts of the body. Supplemental oxygen helps to relieve your symptoms and prevent future problems by getting more oxygen to the blood. Depending on your test results, you may need oxygen all the time. Or it may only be needed during certain activities, such as exercise or sleep. When oxygen is prescribed, youll be referred to a medical equipment company. They will set up the oxygen unit and teach you how to use it.  Nasal cannula     A nasal cannula should be placed in the nose with the prongs arching toward you.     Oxygen is most often inhaled through a nasal cannula (lightweight tube with two hollow prongs that fit just inside the nose).  Types of supplemental oxygen    Compressed oxygen   Oxygen concentrator   Liquid oxygen   Prescribed oxygen comes in several forms. You may use more than one type, depending on when you need oxygen:  · Compressed oxygen is oxygen gas stored in a tank. Because the oxygen is stored under pressure, these tanks must be handled carefully. Gauges on the tank can be used to adjust the oxygen flow rate. Your healthcare provider will determine what this should be. Small tanks can be carried. Larger tanks are on wheels and can be pulled around the house.  · An oxygen concentrator is a machine about the size of a large suitcase. It plugs into an electrical outlet. (A back-up oxygen supply is recommended in case of a power outage.) The machine takes oxygen from the air and concentrates it. Its then delivered to you through plastic tubing. The tubing is long enough so that you can move around the house. When youre using the concentrator, it must be kept somewhere that has a good supply of fresh air. (Dont keep it in a confined space, like a closet.) You may be set  up on a concentrator if you need oxygen all the time or while youre sleeping.  · Liquid oxygen results when oxygen gas is cooled to a very low temperature. Its kept in special containers that maintain this low temperature. When you use liquid oxygen, its warmed and becomes gas before reaching the cannula. Most tanks come with a portable unit that you can carry or pull on a cart. Some of these weigh only a few pounds. Liquid oxygen units are easy to carry around. If you need oxygen all the time or during activity, this kind of unit can help you stay active.  Oxygen is prescribed just for you  Your healthcare provider will prescribe oxygen based on your needs. Here are a few things you should know:  · A therapist from the medical equipment company will explain when to use oxygen and what type to use. Youll be taught how to use and maintain your oxygen equipment.  · You must use the exact rate of oxygen prescribed for each activity. Dont increase or decrease the amount without asking your healthcare provider first.  · Supplemental oxygen is a medicine. Its not addictive and causes no side effects when used as directed.   Date Last Reviewed: 5/1/2016  © 8032-1879 [employer]. [address] [place] [address], [place] [address]. All rights reserved. This information is not intended as a substitute for professional medical care. Always follow your healthcare professional's instructions.

## 2020-01-15 ENCOUNTER — TELEPHONE (OUTPATIENT)
Dept: HEMATOLOGY/ONCOLOGY | Facility: CLINIC | Age: 50
End: 2020-01-15

## 2020-01-16 ENCOUNTER — TELEPHONE (OUTPATIENT)
Dept: CARDIOTHORACIC SURGERY | Facility: CLINIC | Age: 50
End: 2020-01-16

## 2020-01-16 NOTE — TELEPHONE ENCOUNTER
Attempted to call patient regarding Consult with Thoracic Surgery.  Appointment scheduled and reminder mailed.    ----- Message from Jose Ruiz MD sent at 1/15/2020  8:53 AM CST -----  Dr. Crump would like for us to see this patient.  She is from [place], so depending upon the timing, we can see her there.  She needs a PET scan before we see her.  Seeing her before the PET scan will be of no benefit.    p

## 2020-01-20 ENCOUNTER — TELEPHONE (OUTPATIENT)
Dept: CARDIOTHORACIC SURGERY | Facility: CLINIC | Age: 50
End: 2020-01-20

## 2020-01-20 NOTE — TELEPHONE ENCOUNTER
Attempted to contact patient regarding referral to Thoracic Surgery.  Multiple attempts made.  Dialed all contact numbers No answer.

## 2020-01-21 ENCOUNTER — PATIENT MESSAGE (OUTPATIENT)
Dept: PULMONOLOGY | Facility: CLINIC | Age: 50
End: 2020-01-21

## 2020-01-24 ENCOUNTER — TELEPHONE (OUTPATIENT)
Dept: RADIOLOGY | Facility: HOSPITAL | Age: 50
End: 2020-01-24

## 2020-01-29 ENCOUNTER — TELEPHONE (OUTPATIENT)
Dept: RADIOLOGY | Facility: HOSPITAL | Age: 50
End: 2020-01-29

## 2020-01-30 ENCOUNTER — DOCUMENTATION ONLY (OUTPATIENT)
Dept: CARDIOLOGY | Facility: CLINIC | Age: 50
End: 2020-01-30

## 2020-01-30 ENCOUNTER — CLINICAL SUPPORT (OUTPATIENT)
Dept: CARDIOLOGY | Facility: CLINIC | Age: 50
End: 2020-01-30
Attending: INTERNAL MEDICINE
Payer: COMMERCIAL

## 2020-01-30 DIAGNOSIS — J96.11 CHRONIC RESPIRATORY FAILURE WITH HYPOXIA: Chronic | ICD-10-CM

## 2020-01-30 LAB
DIASTOLIC DYSFUNCTION: YES
ESTIMATED PA SYSTOLIC PRESSURE: 61
RETIRED EF AND QEF - SEE NOTES: 60 (ref 55–65)
TRICUSPID VALVE REGURGITATION: ABNORMAL

## 2020-01-30 PROCEDURE — 93306 2D ECHO WITH COLOR FLOW DOPPLER & BUBBLE CONTRAST: ICD-10-PCS | Mod: 26,S$PBB,, | Performed by: INTERNAL MEDICINE

## 2020-01-30 PROCEDURE — 93306 TTE W/DOPPLER COMPLETE: CPT | Mod: PBBFAC | Performed by: INTERNAL MEDICINE

## 2020-01-30 NOTE — PROGRESS NOTES
Pt presented for an echocardiogram with bubble study as ordered.  Procedure was explained to the patient, she verbalized understanding.  IV, 24ga x 1 attempt, was started in the right AC using aseptic technique.  Patient tolerated the procedure well.  IV discontinued, pressure dressing applied.

## 2020-01-31 ENCOUNTER — HOSPITAL ENCOUNTER (OUTPATIENT)
Dept: RADIOLOGY | Facility: HOSPITAL | Age: 50
Discharge: HOME | End: 2020-01-31
Attending: INTERNAL MEDICINE
Payer: COMMERCIAL

## 2020-01-31 DIAGNOSIS — R91.8 MASS OF LEFT LUNG: Chronic | ICD-10-CM

## 2020-01-31 DIAGNOSIS — R91.8 MASS OF LEFT LUNG: ICD-10-CM

## 2020-01-31 PROCEDURE — 78815 PET IMAGE W/CT SKULL-THIGH: CPT | Mod: TC

## 2020-01-31 PROCEDURE — A9552 F18 FDG: HCPCS

## 2020-01-31 PROCEDURE — 78815 NM PET CT ROUTINE: ICD-10-PCS | Mod: 26,PS,, | Performed by: RADIOLOGY

## 2020-01-31 PROCEDURE — 78815 PET IMAGE W/CT SKULL-THIGH: CPT | Mod: 26,PS,, | Performed by: RADIOLOGY

## 2020-02-12 ENCOUNTER — TELEPHONE (OUTPATIENT)
Dept: HEMATOLOGY/ONCOLOGY | Facility: CLINIC | Age: 50
End: 2020-02-12

## 2020-02-12 NOTE — NURSING
Called and spoke with pt regarding need to reschedule consult that was missed.  Pt scheduled to see Dr. Ruiz in [place] on 3/3 at 10:45.  Went over appointment location with pt and she stated that she is aware of where we are located.  Pt did ask if there is a referral from [facility] for the visit.  Explained to pt that it does show an authorized referral but that I would get with our [facility] person here and verify.  Pt verbalized understanding of all information given.

## 2020-02-12 NOTE — TELEPHONE ENCOUNTER
----- Message from Adriana Walters PA-C sent at 2/11/2020  3:30 PM CST -----  Contact: Pt  Can we set this patient up for a clinic visit with Dr. Ruiz on march 3rd when we are in [place] again. I believe we have her on the schedule last week but she did not have a ride.     Thanks     CB   ----- Message -----  From: Maverick Garza  Sent: 2/11/2020   2:45 PM CST  To: Sara [facility] Staff    Rep called request to assist the patient w/ scheduling for an appt from referral callback to discuss appt availability     Callback :[phone] (work)

## 2020-02-14 ENCOUNTER — PATIENT MESSAGE (OUTPATIENT)
Dept: PULMONOLOGY | Facility: CLINIC | Age: 50
End: 2020-02-14

## 2020-03-01 NOTE — H&P (VIEW-ONLY)
History & Physical    SUBJECTIVE:     History of Present Illness:  49 year old female with PMH of obesity, HTN, MARLENA, HFpEF, and DMII presents for evaluation of left lung mass and mediastinal lymphadenopathy.     Never smoker. Denies EtOH use.  PSH of tubal ligation.    Chief Complaint   Patient presents with    Consult       Review of patient's allergies indicates:  No Known Allergies    Current Outpatient Medications   Medication Sig Dispense Refill    albuterol (PROAIR HFA) 90 mcg/actuation inhaler Inhale 2 puffs into the lungs every 6 (six) hours as needed for Wheezing. Rescue      albuterol (PROVENTIL) 2.5 mg /3 mL (0.083 %) nebulizer solution Take 2.5 mg by nebulization every 6 (six) hours as needed for Wheezing. Rescue      amLODIPine (NORVASC) 5 MG tablet Take 5 mg by mouth once daily.      ferrous sulfate (IRON) 325 mg (65 mg iron) Tab tablet Take 325 mg by mouth 3 (three) times daily.      furosemide (LASIX) 40 MG tablet Take 40 mg by mouth 2 (two) times daily.      hydrALAZINE (APRESOLINE) 10 MG tablet Take 10 mg by mouth once daily. 2.5 tablets twice daily      insulin glargine (LANTUS U-100 INSULIN) 100 unit/mL injection Inject 15 Units into the skin 2 (two) times daily.      loratadine (CLARITIN) 10 mg tablet Take 10 mg by mouth once daily.      metFORMIN (GLUCOPHAGE) 850 MG tablet Take 850 mg by mouth 2 (two) times daily with meals.      metoprolol succinate (TOPROL-XL) 100 MG 24 hr tablet Take 100 mg by mouth 2 (two) times daily.      potassium chloride SA (K-DUR,KLOR-CON) 20 MEQ tablet Take 20 mEq by mouth once.      traZODone (DESYREL) 100 MG tablet Take 100 mg by mouth every evening.       No current facility-administered medications for this visit.        Past Medical History:   Diagnosis Date    Diabetes     Hypertension      Past Surgical History:   Procedure Laterality Date    TUBAL LIGATION       No family history on file.  Social History     Tobacco Use    Smoking status:  "Never Smoker    Smokeless tobacco: Never Used   Substance Use Topics    Alcohol use: Not Currently    Drug use: Never        Review of Systems:  Review of Systems   Constitutional: Positive for fatigue.   HENT: Negative.    Respiratory: Positive for shortness of breath.         Exertional dyspnea, obstructive sleep apnea, nasal cannula oxygen needed with exertion   Cardiovascular: Negative.    Skin: Negative.    Neurological: Negative.    Psychiatric/Behavioral: Negative.        OBJECTIVE:     Vital Signs (Most Recent)  Temp: 98.3 °F (36.8 °C) (03/03/20 1108)  Pulse: (!) 59 (03/03/20 1108)  BP: (!) 183/78 (03/03/20 1108)  5' 5" (1.651 m)  109.8 kg (242 lb)   Body mass index is 40.27 kg/m².      Physical Exam:  Physical Exam   Constitutional: She is oriented to person, place, and time. She appears well-developed and well-nourished.   Obese, has nasal cannula oxygen   HENT:   Head: Normocephalic.   Right Ear: External ear normal.   Left Ear: External ear normal.   Mouth/Throat: Oropharynx is clear and moist.   Eyes: Pupils are equal, round, and reactive to light. Conjunctivae are normal.   Neck: No JVD present. No tracheal deviation present.   Cardiovascular: Normal rate, regular rhythm and intact distal pulses.   Pulmonary/Chest: Effort normal and breath sounds normal.   Abdominal: Soft.   Musculoskeletal: Normal range of motion.   Thick tissue along chest wall preventing palpation of ribs   Lymphadenopathy:     She has axillary adenopathy.        Right axillary: Pectoral adenopathy present.   Neurological: She is alert and oriented to person, place, and time.   Skin: Skin is warm and dry. Capillary refill takes less than 2 seconds.   Psychiatric: She has a normal mood and affect.         Diagnostic Results:    1/30/20- 2D ECHO-     1 - Normal left ventricular systolic function (EF 60-65%).     2 - Impaired LV relaxation, elevated LAP (grade 2 diastolic dysfunction).     3 - Right ventricle is upper limit of " normal in size with normal systolic function.     4 - Pulmonary hypertension. The estimated PA systolic pressure is 61 mmHg.     5 - No evidence of intracardiac shunt.     1/14/20- CT Chest without Contrast-   Extensive mediastinal lymphadenopathy for example right paratracheal 34 x 31 mm lymph node, subcarinal 35 x 19 mm lymph node. 65 x 41 mm posteromedial left upper lobe lung mass abutting the pleura. Right axillary 4 x 1.8 cm lymph node. Scattered air trapping suggesting small airway inflammation. Mild-to-moderate cylindrical bronchiectasis.    1/31/20- PET-   Intrathoracic lymphadenopathy including a prominent right axillary lymph node with max SUV of 5.1.  Additional hypermetabolic mediastinal lymph nodes as above.  This is of uncertain etiology but malignancy should not be excluded.  Correlation with sampling can be performed as indicated.  Otherwise, at a minimum, a short-term interval follow-up is suggested.  A posteromedial left upper lung mass is not hypermetabolic and is of uncertain etiology.  Attention on follow-up is suggested.  Left pelvic kidney and other incidental findings as noted above.    ASSESSMENT/PLAN:     Non PET-avid Left-sided lung mass versus bronchogenic cyst  Pulmonary hypertension  Obesity  Obstructive sleep apnea  Need for supplemental oxygen  Hypermetabolic right axillary lymphadenopathy    PLAN:Plan     I recommend an ultrasound-guided needle biopsy of the hypermetabolic right axillary lymph node.  I will obtain records from the prior mediastinoscopy with biopsy, including the operative report and pathology report.  The procedure was performed at [facility] in [facility].  The patient has multiple significant medical comorbidities which would complicate resection of the left side lung mass versus cyst.  I would like to exclude the possibility of a metastatic malignancy by having the right axillary lymph node biopsy.  If this is negative for malignancy, I will began to  workup the left-sided mediastinal/lung mass.  Evaluation will possibly include a CT-guided biopsy.  Surgical resection will only be considered if cardiopulmonary clearance is obtained as the patient appears to be at extremely high risk for resection due to her multiple medical comorbidities.

## 2020-03-01 NOTE — PROGRESS NOTES
History & Physical    SUBJECTIVE:     History of Present Illness:  49 year old female with PMH of obesity, HTN, MARLENA, HFpEF, and DMII presents for evaluation of left lung mass and mediastinal lymphadenopathy.     Never smoker. Denies EtOH use.  PSH of tubal ligation.    Chief Complaint   Patient presents with    Consult       Review of patient's allergies indicates:  No Known Allergies    Current Outpatient Medications   Medication Sig Dispense Refill    albuterol (PROAIR HFA) 90 mcg/actuation inhaler Inhale 2 puffs into the lungs every 6 (six) hours as needed for Wheezing. Rescue      albuterol (PROVENTIL) 2.5 mg /3 mL (0.083 %) nebulizer solution Take 2.5 mg by nebulization every 6 (six) hours as needed for Wheezing. Rescue      amLODIPine (NORVASC) 5 MG tablet Take 5 mg by mouth once daily.      ferrous sulfate (IRON) 325 mg (65 mg iron) Tab tablet Take 325 mg by mouth 3 (three) times daily.      furosemide (LASIX) 40 MG tablet Take 40 mg by mouth 2 (two) times daily.      hydrALAZINE (APRESOLINE) 10 MG tablet Take 10 mg by mouth once daily. 2.5 tablets twice daily      insulin glargine (LANTUS U-100 INSULIN) 100 unit/mL injection Inject 15 Units into the skin 2 (two) times daily.      loratadine (CLARITIN) 10 mg tablet Take 10 mg by mouth once daily.      metFORMIN (GLUCOPHAGE) 850 MG tablet Take 850 mg by mouth 2 (two) times daily with meals.      metoprolol succinate (TOPROL-XL) 100 MG 24 hr tablet Take 100 mg by mouth 2 (two) times daily.      potassium chloride SA (K-DUR,KLOR-CON) 20 MEQ tablet Take 20 mEq by mouth once.      traZODone (DESYREL) 100 MG tablet Take 100 mg by mouth every evening.       No current facility-administered medications for this visit.        Past Medical History:   Diagnosis Date    Diabetes     Hypertension      Past Surgical History:   Procedure Laterality Date    TUBAL LIGATION       No family history on file.  Social History     Tobacco Use    Smoking status:  "Never Smoker    Smokeless tobacco: Never Used   Substance Use Topics    Alcohol use: Not Currently    Drug use: Never        Review of Systems:  Review of Systems   Constitutional: Positive for fatigue.   HENT: Negative.    Respiratory: Positive for shortness of breath.         Exertional dyspnea, obstructive sleep apnea, nasal cannula oxygen needed with exertion   Cardiovascular: Negative.    Skin: Negative.    Neurological: Negative.    Psychiatric/Behavioral: Negative.        OBJECTIVE:     Vital Signs (Most Recent)  Temp: 98.3 °F (36.8 °C) (03/03/20 1108)  Pulse: (!) 59 (03/03/20 1108)  BP: (!) 183/78 (03/03/20 1108)  5' 5" (1.651 m)  109.8 kg (242 lb)   Body mass index is 40.27 kg/m².      Physical Exam:  Physical Exam   Constitutional: She is oriented to person, place, and time. She appears well-developed and well-nourished.   Obese, has nasal cannula oxygen   HENT:   Head: Normocephalic.   Right Ear: External ear normal.   Left Ear: External ear normal.   Mouth/Throat: Oropharynx is clear and moist.   Eyes: Pupils are equal, round, and reactive to light. Conjunctivae are normal.   Neck: No JVD present. No tracheal deviation present.   Cardiovascular: Normal rate, regular rhythm and intact distal pulses.   Pulmonary/Chest: Effort normal and breath sounds normal.   Abdominal: Soft.   Musculoskeletal: Normal range of motion.   Thick tissue along chest wall preventing palpation of ribs   Lymphadenopathy:     She has axillary adenopathy.        Right axillary: Pectoral adenopathy present.   Neurological: She is alert and oriented to person, place, and time.   Skin: Skin is warm and dry. Capillary refill takes less than 2 seconds.   Psychiatric: She has a normal mood and affect.         Diagnostic Results:    1/30/20- 2D ECHO-     1 - Normal left ventricular systolic function (EF 60-65%).     2 - Impaired LV relaxation, elevated LAP (grade 2 diastolic dysfunction).     3 - Right ventricle is upper limit of " normal in size with normal systolic function.     4 - Pulmonary hypertension. The estimated PA systolic pressure is 61 mmHg.     5 - No evidence of intracardiac shunt.     1/14/20- CT Chest without Contrast-   Extensive mediastinal lymphadenopathy for example right paratracheal 34 x 31 mm lymph node, subcarinal 35 x 19 mm lymph node. 65 x 41 mm posteromedial left upper lobe lung mass abutting the pleura. Right axillary 4 x 1.8 cm lymph node. Scattered air trapping suggesting small airway inflammation. Mild-to-moderate cylindrical bronchiectasis.    1/31/20- PET-   Intrathoracic lymphadenopathy including a prominent right axillary lymph node with max SUV of 5.1.  Additional hypermetabolic mediastinal lymph nodes as above.  This is of uncertain etiology but malignancy should not be excluded.  Correlation with sampling can be performed as indicated.  Otherwise, at a minimum, a short-term interval follow-up is suggested.  A posteromedial left upper lung mass is not hypermetabolic and is of uncertain etiology.  Attention on follow-up is suggested.  Left pelvic kidney and other incidental findings as noted above.    ASSESSMENT/PLAN:     Non PET-avid Left-sided lung mass versus bronchogenic cyst  Pulmonary hypertension  Obesity  Obstructive sleep apnea  Need for supplemental oxygen  Hypermetabolic right axillary lymphadenopathy    PLAN:Plan     I recommend an ultrasound-guided needle biopsy of the hypermetabolic right axillary lymph node.  I will obtain records from the prior mediastinoscopy with biopsy, including the operative report and pathology report.  The procedure was performed at [facility] in [facility].  The patient has multiple significant medical comorbidities which would complicate resection of the left side lung mass versus cyst.  I would like to exclude the possibility of a metastatic malignancy by having the right axillary lymph node biopsy.  If this is negative for malignancy, I will began to  workup the left-sided mediastinal/lung mass.  Evaluation will possibly include a CT-guided biopsy.  Surgical resection will only be considered if cardiopulmonary clearance is obtained as the patient appears to be at extremely high risk for resection due to her multiple medical comorbidities.

## 2020-03-03 ENCOUNTER — OFFICE VISIT (OUTPATIENT)
Dept: CARDIOTHORACIC SURGERY | Facility: CLINIC | Age: 50
End: 2020-03-03
Payer: COMMERCIAL

## 2020-03-03 VITALS
SYSTOLIC BLOOD PRESSURE: 183 MMHG | HEART RATE: 59 BPM | HEIGHT: 65 IN | TEMPERATURE: 98 F | BODY MASS INDEX: 40.32 KG/M2 | WEIGHT: 242 LBS | DIASTOLIC BLOOD PRESSURE: 78 MMHG

## 2020-03-03 DIAGNOSIS — Z99.81 DEPENDENCE ON SUPPLEMENTAL OXYGEN: ICD-10-CM

## 2020-03-03 DIAGNOSIS — J98.4 BRONCHOGENIC CYST: Primary | ICD-10-CM

## 2020-03-03 DIAGNOSIS — R59.0 AXILLARY LYMPHADENOPATHY: ICD-10-CM

## 2020-03-03 DIAGNOSIS — I27.20 PULMONARY HYPERTENSION: ICD-10-CM

## 2020-03-03 DIAGNOSIS — G47.33 OBSTRUCTIVE SLEEP APNEA: ICD-10-CM

## 2020-03-03 PROCEDURE — 99204 OFFICE O/P NEW MOD 45 MIN: CPT | Mod: S$PBB,,, | Performed by: THORACIC SURGERY (CARDIOTHORACIC VASCULAR SURGERY)

## 2020-03-03 PROCEDURE — 99204 PR OFFICE/OUTPT VISIT, NEW, LEVL IV, 45-59 MIN: ICD-10-PCS | Mod: S$PBB,,, | Performed by: THORACIC SURGERY (CARDIOTHORACIC VASCULAR SURGERY)

## 2020-03-03 PROCEDURE — 99213 OFFICE O/P EST LOW 20 MIN: CPT | Mod: PBBFAC | Performed by: THORACIC SURGERY (CARDIOTHORACIC VASCULAR SURGERY)

## 2020-03-03 PROCEDURE — 99999 PR PBB SHADOW E&M-EST. PATIENT-LVL III: CPT | Mod: PBBFAC,,, | Performed by: THORACIC SURGERY (CARDIOTHORACIC VASCULAR SURGERY)

## 2020-03-03 PROCEDURE — 99999 PR PBB SHADOW E&M-EST. PATIENT-LVL III: ICD-10-PCS | Mod: PBBFAC,,, | Performed by: THORACIC SURGERY (CARDIOTHORACIC VASCULAR SURGERY)

## 2020-03-03 NOTE — LETTER
March 3, 2020    Nu Yañez  [address]  [place] [address]             [facility] - Thoracic Surgery  Cardiothoracic Surgery  [address] [place] [address] DRIVE  [facility] [address]  Phone: [phone]  Fax: [phone]   March 3, 2020     Patient: Nu Yañez   YOB: 1970   Date of Visit: 3/3/2020       To Whom it May Concern:    Nu Yañez was seen in my clinic on 3/3/2020. She may return on 03/04/2020    Please excuse her from any classes or work missed.    If you have any questions or concerns, please don't hesitate to call.    Sincerely,         Jose Ruiz MD

## 2020-03-03 NOTE — LETTER
[facility] - Thoracic Surgery  [address] [address]  Phone: [phone]  Fax: [phone] March 4, 2020        Alejandro Crump MD  [address]  [place] [address]    Patient: Nu Yañez   MR Number: [id number]   YOB: 1970   Date of Visit: 3/3/2020     Dear Dr. Crump:    Thank you for referring Nu Yañez to me for evaluation. Ms. Yañez presents for evaluation of a large left lung/mediastinal mass, mediastinal lymphadenopathy and right axillary lymphadenopathy.  Workup has included a chest CT as well as a PET/ CT.  The latter study only shows hypermetabolic activity within an enlarged right axillary lymph node.  Ms. Yañez has a very complicated medical history that is notable for obstructive sleep apnea with pulmonary hypertension, exertional dyspnea requiring supplemental oxygen, and obesity.     I recommend an ultrasound-guided needle biopsy of the palpable hypermetabolic right axillary lymph node to rule out malignancy. If this is negative for malignancy, I will began to workup the left-sided mediastinal/lung mass.  Evaluation will possibly include a CT-guided biopsy.  Surgical resection will only be considered if cardiopulmonary clearance is obtained as the patient appears to be at extremely high risk for resection due to her multiple medical comorbidities.     In the interim, I will obtain records from the prior mediastinoscopy with biopsy, including the operative report and pathology report.  The procedure was performed at [facility] in [facility].  The  multiple significant medical comorbidities will complicate resection of the left sided lung mass versus cyst.      Sincerely,      Jose Ruiz MD    BP/alen

## 2020-03-05 ENCOUNTER — TELEPHONE (OUTPATIENT)
Dept: CARDIOTHORACIC SURGERY | Facility: CLINIC | Age: 50
End: 2020-03-05

## 2020-03-06 ENCOUNTER — TELEPHONE (OUTPATIENT)
Dept: CARDIOTHORACIC SURGERY | Facility: CLINIC | Age: 50
End: 2020-03-06

## 2020-03-06 NOTE — TELEPHONE ENCOUNTER
Called patient to inform her of her u/s biopsy appointment in [place].  Provided phone number of the department if she needs to reschedule.  States she may have to.

## 2020-03-13 ENCOUNTER — HOSPITAL ENCOUNTER (OUTPATIENT)
Dept: RADIOLOGY | Facility: HOSPITAL | Age: 50
Discharge: HOME | End: 2020-03-13
Attending: PHYSICIAN ASSISTANT
Payer: COMMERCIAL

## 2020-03-13 DIAGNOSIS — R59.0 AXILLARY LYMPHADENOPATHY: ICD-10-CM

## 2020-03-13 PROCEDURE — 38505 NEEDLE BIOPSY LYMPH NODES: CPT

## 2020-03-13 PROCEDURE — 88189 FLOWCYTOMETRY/READ 16 & >: CPT | Mod: ,,, | Performed by: PATHOLOGY

## 2020-03-13 PROCEDURE — 88305 TISSUE EXAM BY PATHOLOGIST: ICD-10-PCS | Mod: 26,,, | Performed by: PATHOLOGY

## 2020-03-13 PROCEDURE — 88305 TISSUE EXAM BY PATHOLOGIST: CPT | Mod: 26,,, | Performed by: PATHOLOGY

## 2020-03-13 PROCEDURE — 88185 FLOWCYTOMETRY/TC ADD-ON: CPT | Performed by: PATHOLOGY

## 2020-03-13 PROCEDURE — 76942 ECHO GUIDE FOR BIOPSY: CPT | Mod: TC

## 2020-03-13 PROCEDURE — 88305 TISSUE EXAM BY PATHOLOGIST: CPT | Performed by: PATHOLOGY

## 2020-03-13 PROCEDURE — 88184 FLOWCYTOMETRY/ TC 1 MARKER: CPT | Performed by: PATHOLOGY

## 2020-03-13 PROCEDURE — 88189 PR  FLOWCYTOMETRY/READ, 16 & > MARKERS: ICD-10-PCS | Mod: ,,, | Performed by: PATHOLOGY

## 2020-03-13 NOTE — DISCHARGE SUMMARY
Pre Op Diagnosis: right axillary lymph node     Post Op Diagnosis: same     Procedure:  Node biopsy     Procedure performed by: Ligia WOO, Haim PARK     Written Informed Consent Obtained: Yes     Specimen Removed:  yes     Estimated Blood Loss:  minimal     Findings: Local anesthesia and moderate sedation were used.     The patient tolerated the procedure well and there were no complications.      Sterile technique was performed in the right axillary, lidocaine was used as a local anesthetic.  Multiple samples taken from the right axillary lymph node.  Pt tolerated the procedure well without immediate complications.  Please see radiologist report for details. F/u with PCP and/or ordering physician.

## 2020-03-16 ENCOUNTER — PATIENT MESSAGE (OUTPATIENT)
Dept: CARDIOTHORACIC SURGERY | Facility: CLINIC | Age: 50
End: 2020-03-16

## 2020-03-16 ENCOUNTER — TELEPHONE (OUTPATIENT)
Dept: RADIOLOGY | Facility: HOSPITAL | Age: 50
End: 2020-03-16

## 2020-03-16 LAB
FLOW CYTOMETRY ANTIBODIES ANALYZED - LYMPH NODE: NORMAL
FLOW CYTOMETRY COMMENT - LYMPH NODE: NORMAL
FLOW CYTOMETRY INTERPRETATION - LYMPH NODE: NORMAL

## 2020-03-24 LAB
FINAL PATHOLOGIC DIAGNOSIS: NORMAL
GROSS: NORMAL

## 2020-03-25 ENCOUNTER — TELEPHONE (OUTPATIENT)
Dept: CARDIOTHORACIC SURGERY | Facility: HOSPITAL | Age: 50
End: 2020-03-25

## 2020-05-01 ENCOUNTER — HOSPITAL ENCOUNTER (OUTPATIENT)
Dept: RADIOLOGY | Facility: HOSPITAL | Age: 50
Discharge: HOME | End: 2020-05-01
Attending: PHYSICIAN ASSISTANT
Payer: COMMERCIAL

## 2020-05-01 DIAGNOSIS — J98.4 BRONCHOGENIC CYST: ICD-10-CM

## 2020-05-01 PROCEDURE — 71275 CT ANGIOGRAPHY CHEST: CPT | Mod: TC

## 2020-05-01 PROCEDURE — 25500020 PHARM REV CODE 255: Performed by: PHYSICIAN ASSISTANT

## 2020-05-01 RX ADMIN — IOHEXOL 100 ML: 350 INJECTION, SOLUTION INTRAVENOUS at 10:05

## 2020-05-06 ENCOUNTER — TELEPHONE (OUTPATIENT)
Dept: SURGERY | Facility: CLINIC | Age: 50
End: 2020-05-06

## 2020-05-06 ENCOUNTER — DOCUMENTATION ONLY (OUTPATIENT)
Dept: CARDIOTHORACIC SURGERY | Facility: HOSPITAL | Age: 50
End: 2020-05-06

## 2020-05-06 NOTE — PATIENT CARE CONFERENCE
[facility]      THORACIC MULTIDISCIPLINARY TUMOR BOARD  PATIENT REVIEW FORM  ________________________________________________________________________    CLINIC #: [id number]  DATE: 05/06/2020    DIAGNOSIS: Mediastinal Lymphadenopathy      PRESENTER: Dr. Ruiz    PATIENT SUMMARY: 49 year old oxygen dependent female with PMH of obesity, HTN, MARLENA, HFpEF, PAH (PA systolic pressure is 61 mmHg) and DMII presents for evaluation of left lung mass and mediastinal lymphadenopathy. PET CT 1/30/20 showed hypermetabolic right axillary LN and additional mildly avid mediastinal lymphadenopathy. Additionally, it showed a 6.5 x 4.1 cm non-hypermetabolic well-marginated lesion in posteromedial left chest. There is no mass effect nor does it appear to invade surrounding structures. She underwent a US guided biopsy of right axillary LN on 3/13/20. Pathology showed minute fragments of lymphoid tissue with increased plasma cells. Per radiologys recommendation, patient underwent a repeat CT with 100mL of contrast in systemic arterial phase on 5/1/20. Bulky mediastinal, hilar and axillary lymphadenopathy again noted but unchanged in size since January 2020. Again demonstrated is a 41 x 65mm posteromedial left upper lobe mass which abuts pleura and descending thoracic aorta. The patient has multiple significant medical comorbidities which would complicate resection of the left side lung mass versus cyst. 1/14/20- PFTS-FEV1 1.17 47% of predicted and DLCO 10.5 42% of predicted    BOARD RECOMMENDATIONS: On new imaging, lesion in left upper lobe appears most consistent with an additional site of lymphadenopathy. Patient is not a candidate for excisional biopsy via left chest. Recommend excisional biopsy of axillary lymph node. Recommend sending specimen for flow cytometry to rule out lymphoma.       CONSULT NEEDED:     [x] Surgery    [] Hem/Onc    [] Rad/Onc    [] Dietary                 [] Social Service    [] Psychology       []  Pulmonology    Clinical Stage: Tumor  Node(s)  Metastasis     Pathologic Stage: Tumor  Node(s)  Metastasis     GROUP STAGE:     [] O    [] 1A    [] IB    [] IIA    [] IIB     [] IIIA     [] IIIB     [] IIIC    [] IV                               [] Local recurrence     [] Regional recurrence     [] Distant recurrence                   [] NSCLC     [] SCLC     Tumor type     Unstageable:      [] Yes     [] No  Metastatic site(s):          [] Jami'l Treatment Guidelines reviewed and care planned is consistent with guidelines.         (i.e., NCCN, NCI, PD, ACO, AUA, etc.)    PRESENTATION AT CANCER CONFERENCE:         [] Prospective    [] Retrospective     [] Follow-Up          [] Eligible for clinical trial

## 2020-05-06 NOTE — TELEPHONE ENCOUNTER
I called the patient to discuss the findings from today's multi-disciplinary lung tumor board.  The tumor were consensus is that the large left mediastinal mass likely represents a conglomeration of mediastinal and hilar lymph nodes.  The intrathoracic lymphadenopathy is not PET avid.  The right axillary FNA appears to be nondiagnostic.  The right axillary lymph node is the only hypermetabolic area on the most recent PET scan.  It was recommended that the patient undergo an excisional biopsy of the right axillary lymph node.  The multiple significant medical comorbidities make surgical exploration very risky.  The perioperative risks outweigh any potential benefit a specially considering that biopsy of the mass along the be for diagnostic purposes.  Any attempt at resection of the left mediastinal mass is ill-advised considering that the patient has bulky multi station mediastinal lymphadenopathy.  I informed her of this conclusion from the multi-disciplinary lung tumor board.  I assured her that my office will consult a general surgeon in [place] to arrange a right axillary excisional biopsy.

## 2020-05-07 DIAGNOSIS — R59.0 AXILLARY LYMPHADENOPATHY: Primary | ICD-10-CM

## 2020-05-27 ENCOUNTER — PATIENT MESSAGE (OUTPATIENT)
Dept: CARDIOTHORACIC SURGERY | Facility: CLINIC | Age: 50
End: 2020-05-27

## 2020-05-27 ENCOUNTER — TELEPHONE (OUTPATIENT)
Dept: SURGERY | Facility: CLINIC | Age: 50
End: 2020-05-27

## 2020-05-28 ENCOUNTER — PATIENT MESSAGE (OUTPATIENT)
Dept: CARDIOTHORACIC SURGERY | Facility: CLINIC | Age: 50
End: 2020-05-28

## 2020-05-28 ENCOUNTER — PATIENT MESSAGE (OUTPATIENT)
Dept: CARDIOTHORACIC SURGERY | Facility: HOSPITAL | Age: 50
End: 2020-05-28

## 2020-05-28 NOTE — TELEPHONE ENCOUNTER
----- Message from Denisha Whitley sent at 5/28/2020  1:31 PM CDT -----  Contact: Patient  Patient needs to reschedule her cancelled appt, but because it was cancelled it wont let me reschedule, please call her back at [phone]. Thank you

## 2020-06-17 ENCOUNTER — OFFICE VISIT (OUTPATIENT)
Dept: SURGERY | Facility: CLINIC | Age: 50
End: 2020-06-17
Payer: COMMERCIAL

## 2020-06-17 ENCOUNTER — LAB VISIT (OUTPATIENT)
Dept: LAB | Facility: HOSPITAL | Age: 50
End: 2020-06-17
Attending: SURGERY
Payer: COMMERCIAL

## 2020-06-17 ENCOUNTER — HOSPITAL ENCOUNTER (OUTPATIENT)
Dept: CARDIOLOGY | Facility: HOSPITAL | Age: 50
Discharge: HOME | End: 2020-06-17
Attending: SURGERY
Payer: COMMERCIAL

## 2020-06-17 VITALS
WEIGHT: 242.94 LBS | DIASTOLIC BLOOD PRESSURE: 93 MMHG | HEART RATE: 61 BPM | BODY MASS INDEX: 40.48 KG/M2 | HEIGHT: 65 IN | TEMPERATURE: 98 F | SYSTOLIC BLOOD PRESSURE: 183 MMHG

## 2020-06-17 DIAGNOSIS — R59.0 LYMPHADENOPATHY, AXILLARY: Primary | ICD-10-CM

## 2020-06-17 DIAGNOSIS — Z01.818 PREOP TESTING: Primary | ICD-10-CM

## 2020-06-17 DIAGNOSIS — R59.0 LYMPHADENOPATHY, AXILLARY: ICD-10-CM

## 2020-06-17 LAB
BASOPHILS # BLD AUTO: 0.07 K/UL (ref 0–0.2)
BASOPHILS NFR BLD: 0.9 % (ref 0–1.9)
DIFFERENTIAL METHOD: ABNORMAL
EOSINOPHIL # BLD AUTO: 0.6 K/UL (ref 0–0.5)
EOSINOPHIL NFR BLD: 8.5 % (ref 0–8)
ERYTHROCYTE [DISTWIDTH] IN BLOOD BY AUTOMATED COUNT: 19.8 % (ref 11.5–14.5)
HCT VFR BLD AUTO: 54.3 % (ref 37–48.5)
HGB BLD-MCNC: 15.5 G/DL (ref 12–16)
IMM GRANULOCYTES # BLD AUTO: 0.02 K/UL (ref 0–0.04)
IMM GRANULOCYTES NFR BLD AUTO: 0.3 % (ref 0–0.5)
LYMPHOCYTES # BLD AUTO: 1.4 K/UL (ref 1–4.8)
LYMPHOCYTES NFR BLD: 18.3 % (ref 18–48)
MCH RBC QN AUTO: 25 PG (ref 27–31)
MCHC RBC AUTO-ENTMCNC: 28.5 G/DL (ref 32–36)
MCV RBC AUTO: 87 FL (ref 82–98)
MONOCYTES # BLD AUTO: 0.5 K/UL (ref 0.3–1)
MONOCYTES NFR BLD: 6.7 % (ref 4–15)
NEUTROPHILS # BLD AUTO: 4.9 K/UL (ref 1.8–7.7)
NEUTROPHILS NFR BLD: 65.3 % (ref 38–73)
NRBC BLD-RTO: 0 /100 WBC
PLATELET # BLD AUTO: 322 K/UL (ref 150–350)
PMV BLD AUTO: 12.3 FL (ref 9.2–12.9)
RBC # BLD AUTO: 6.21 M/UL (ref 4–5.4)
WBC # BLD AUTO: 7.45 K/UL (ref 3.9–12.7)

## 2020-06-17 PROCEDURE — 93010 EKG 12-LEAD: ICD-10-PCS | Mod: ,,, | Performed by: INTERNAL MEDICINE

## 2020-06-17 PROCEDURE — 36415 COLL VENOUS BLD VENIPUNCTURE: CPT

## 2020-06-17 PROCEDURE — 99215 OFFICE O/P EST HI 40 MIN: CPT | Mod: PBBFAC,25 | Performed by: SURGERY

## 2020-06-17 PROCEDURE — 93005 ELECTROCARDIOGRAM TRACING: CPT

## 2020-06-17 PROCEDURE — 93010 ELECTROCARDIOGRAM REPORT: CPT | Mod: ,,, | Performed by: INTERNAL MEDICINE

## 2020-06-17 PROCEDURE — 99999 PR PBB SHADOW E&M-EST. PATIENT-LVL V: ICD-10-PCS | Mod: PBBFAC,,, | Performed by: SURGERY

## 2020-06-17 PROCEDURE — 99204 PR OFFICE/OUTPT VISIT, NEW, LEVL IV, 45-59 MIN: ICD-10-PCS | Mod: S$PBB,,, | Performed by: SURGERY

## 2020-06-17 PROCEDURE — 85025 COMPLETE CBC W/AUTO DIFF WBC: CPT

## 2020-06-17 PROCEDURE — 80053 COMPREHEN METABOLIC PANEL: CPT

## 2020-06-17 PROCEDURE — 99204 OFFICE O/P NEW MOD 45 MIN: CPT | Mod: S$PBB,,, | Performed by: SURGERY

## 2020-06-17 PROCEDURE — 99999 PR PBB SHADOW E&M-EST. PATIENT-LVL V: CPT | Mod: PBBFAC,,, | Performed by: SURGERY

## 2020-06-17 RX ORDER — LIDOCAINE HYDROCHLORIDE 10 MG/ML
1 INJECTION, SOLUTION EPIDURAL; INFILTRATION; INTRACAUDAL; PERINEURAL ONCE
Status: CANCELLED | OUTPATIENT
Start: 2020-06-17 | End: 2020-06-17

## 2020-06-17 RX ORDER — SODIUM CHLORIDE 9 MG/ML
INJECTION, SOLUTION INTRAVENOUS CONTINUOUS
Status: CANCELLED | OUTPATIENT
Start: 2020-06-17

## 2020-06-17 NOTE — LETTER
June 17, 2020    Nu Yañez  [address]  [place] [address]             [facility] General Surgery  Surgery  [address] [address]  Phone: [phone]  Fax: [phone]   June 17, 2020     Patient: Nu Yañez   YOB: 1970   Date of Visit: 6/17/2020       To Whom it May Concern:    Nu Yañez was seen in my clinic on 6/17/2020. She may return to work on 06/18/2020  Please excuse her from any classes or work missed.    If you have any questions or concerns, please don't hesitate to call.    Sincerely,         Naif Rico MD

## 2020-06-17 NOTE — PROGRESS NOTES
History & Physical    SUBJECTIVE:     History of Present Illness:  Patient is a 49 y.o. female referred for axillary lymph node biopsy.  Patient is undergoing workup for mediastinal/hilar mass thought to be enlarged lymph nodes.  Patient also noted to have PET avid right axillary node.  She previously and went core biopsy which was nondiagnostic.  Denies any fevers chills, weight loss, fatigue or any other changes.    Chief Complaint   Patient presents with    Axillary Lymphadenopathy       Review of patient's allergies indicates:  No Known Allergies    Current Outpatient Medications   Medication Sig Dispense Refill    albuterol (PROAIR HFA) 90 mcg/actuation inhaler Inhale 2 puffs into the lungs every 6 (six) hours as needed for Wheezing. Rescue      albuterol (PROVENTIL) 2.5 mg /3 mL (0.083 %) nebulizer solution Take 2.5 mg by nebulization every 6 (six) hours as needed for Wheezing. Rescue      amLODIPine (NORVASC) 5 MG tablet Take 5 mg by mouth once daily.      ferrous sulfate (IRON) 325 mg (65 mg iron) Tab tablet Take 325 mg by mouth 3 (three) times daily.      furosemide (LASIX) 40 MG tablet Take 40 mg by mouth 2 (two) times daily.      hydrALAZINE (APRESOLINE) 10 MG tablet Take 10 mg by mouth once daily. 2.5 tablets twice daily      insulin glargine (LANTUS U-100 INSULIN) 100 unit/mL injection Inject 15 Units into the skin 2 (two) times daily.      loratadine (CLARITIN) 10 mg tablet Take 10 mg by mouth once daily.      metFORMIN (GLUCOPHAGE) 850 MG tablet Take 850 mg by mouth 2 (two) times daily with meals.      metoprolol succinate (TOPROL-XL) 100 MG 24 hr tablet Take 100 mg by mouth 2 (two) times daily.      potassium chloride SA (K-DUR,KLOR-CON) 20 MEQ tablet Take 20 mEq by mouth once.      traZODone (DESYREL) 100 MG tablet Take 100 mg by mouth every evening.       No current facility-administered medications for this visit.        Past Medical History:   Diagnosis Date    Diabetes      "Hypertension      Past Surgical History:   Procedure Laterality Date    TUBAL LIGATION       History reviewed. No pertinent family history.  Social History     Tobacco Use    Smoking status: Never Smoker    Smokeless tobacco: Never Used   Substance Use Topics    Alcohol use: Not Currently    Drug use: Never        Review of Systems:  Review of Systems   Constitutional: Negative for activity change, appetite change, chills, diaphoresis, fatigue, fever and unexpected weight change.   HENT: Negative for congestion, dental problem, rhinorrhea and sore throat.    Eyes: Negative for visual disturbance.   Respiratory: Negative for cough, chest tightness, shortness of breath and wheezing.    Cardiovascular: Negative for chest pain, palpitations and leg swelling.   Gastrointestinal: Negative for abdominal distention, abdominal pain, constipation, diarrhea, nausea and vomiting.   Endocrine: Negative for cold intolerance, heat intolerance, polydipsia, polyphagia and polyuria.   Genitourinary: Negative for difficulty urinating, dysuria, frequency, hematuria and urgency.   Musculoskeletal: Negative for arthralgias, gait problem, myalgias and neck pain.   Skin: Negative for color change, pallor, rash and wound.   Neurological: Negative for dizziness, syncope, weakness, light-headedness, numbness and headaches.   Hematological: Negative for adenopathy. Does not bruise/bleed easily.   Psychiatric/Behavioral: Negative for confusion, decreased concentration and sleep disturbance. The patient is not nervous/anxious.        OBJECTIVE:     Vital Signs (Most Recent)  Temp: 98.3 °F (36.8 °C) (06/17/20 1024)  Pulse: 61 (06/17/20 1024)  BP: (!) 183/93 (06/17/20 1024)  5' 5" (1.651 m)  110.2 kg (242 lb 15.2 oz)     Physical Exam:  Physical Exam  Vitals signs reviewed.   Constitutional:       General: She is not in acute distress.     Appearance: She is well-developed. She is not diaphoretic.   HENT:      Head: Normocephalic and " atraumatic.      Right Ear: External ear normal.      Left Ear: External ear normal.   Eyes:      General: No scleral icterus.     Conjunctiva/sclera: Conjunctivae normal.      Pupils: Pupils are equal, round, and reactive to light.   Neck:      Musculoskeletal: Normal range of motion and neck supple.      Thyroid: No thyromegaly.      Trachea: No tracheal deviation.   Cardiovascular:      Rate and Rhythm: Normal rate and regular rhythm.      Heart sounds: Normal heart sounds. No murmur. No friction rub. No gallop.    Pulmonary:      Effort: Pulmonary effort is normal. No respiratory distress.      Breath sounds: Normal breath sounds. No wheezing or rales.   Chest:      Chest wall: No tenderness.      Breasts:         Right: No inverted nipple, mass, nipple discharge, skin change or tenderness.         Left: No inverted nipple, mass, nipple discharge, skin change or tenderness.       Abdominal:      General: Bowel sounds are normal. There is no distension.      Palpations: Abdomen is soft.      Tenderness: There is no abdominal tenderness.      Hernia: No hernia is present.   Musculoskeletal: Normal range of motion.         General: No tenderness or deformity.   Lymphadenopathy:      Cervical: No cervical adenopathy.   Skin:     General: Skin is warm and dry.      Coloration: Skin is not pale.      Findings: No erythema or rash.   Neurological:      Mental Status: She is alert and oriented to person, place, and time.   Psychiatric:         Behavior: Behavior normal.         Thought Content: Thought content normal.         Judgment: Judgment normal.             Diagnostic Results:  PET-CT:  Impression:  Intrathoracic lymphadenopathy including a prominent right axillary lymph node with max SUV of 5.1.  Additional hypermetabolic mediastinal lymph nodes as above.  This is of uncertain etiology but malignancy should be excluded.  Correlation with sampling can be performed as indicated.  Otherwise, at a minimum, a  short-term interval follow-up is suggested.  A posteromedial left upper lung mass is not hypermetabolic and is of uncertain etiology.  Attention on follow-up is suggested.  Left pelvic kidney and other incidental findings as noted above.    CT chest:  1. No evidence of pulmonary embolism.  2. Again demonstrated is bulky lymphadenopathy within the chest.  It is unchanged.  It does not significantly compress vascular are bronchial structures.  Differential considerations include inflammatory disease such as sarcoidosis or neoplasia.  3. Geographic ground-glass opacity within the lungs may relate to air trapping such as with small airways disease.    Final Pathologic Diagnosis RELIAPATH DIAGNOSIS:   LYMPH NODE, RIGHT AXILLA, CORE BIOPSY:   - Minute fragments of lymphoid tissue with increased plasma cells, see   comment.   COMMENT:  Histologic evaluation of this specimen is limited secondary to its   minute nature.  Collections of mature appearing plasma cells are present in a   background of predominantly small lymphocytes.  Increased plasma cell   populations in lymph nodes can be seen with Castleman's disease,   reactive/follicular hyperplasia, certain plasmacytoid lymphomas, syphilis,   and myeloma.  Correlation with clinical and radiographic studies is   recommended.  Repeat biopsy for flow cytometry is recommended, if clinically   indicated.          ASSESSMENT/PLAN:     49-year-old female with lymphadenopathy and PET avid right axillary lymphadenopathy    PLAN:Plan     Excisional biopsy right axillary lymph node with wire localization 07/07/2020  Preop:  CBC, CMP, EKG, COVID-19  Risks and benefits discussed with patient including:  Pain, bleeding, infection, injury to underlying nerves or vessels, lymphedema, no diagnosis, need for further procedure

## 2020-06-17 NOTE — LETTER
June 17, 2020      Adriana Walters PA-C  [address] [address]           [facility]  [address] [address]  Phone: [phone]  Fax: [phone]          Patient: Nu Yañez   MR Number: [id number]   YOB: 1970   Date of Visit: 6/17/2020       Dear Adriana Walters:    Thank you for referring Nu Yañez to me for evaluation. Attached you will find relevant portions of my assessment and plan of care.    If you have questions, please do not hesitate to call me. I look forward to following Nu Yañez along with you.    Sincerely,    Naif Rico MD    Enclosure  CC:  No Recipients    If you would like to receive this communication electronically, please contact [email or web address] or [phone] to request more information on [employer] access.    For providers and/or their staff who would like to refer a patient to [facility], please contact us through our one-stop-shop provider referral line, [facility] , at [phone].    If you feel you have received this communication in error or would no longer like to receive these types of communications, please e-mail [email or web address]

## 2020-06-18 LAB
ALBUMIN SERPL BCP-MCNC: 3.6 G/DL (ref 3.5–5.2)
ALP SERPL-CCNC: 77 U/L (ref 55–135)
ALT SERPL W/O P-5'-P-CCNC: 19 U/L (ref 10–44)
ANION GAP SERPL CALC-SCNC: 8 MMOL/L (ref 8–16)
AST SERPL-CCNC: 19 U/L (ref 10–40)
BILIRUB SERPL-MCNC: 0.6 MG/DL (ref 0.1–1)
BUN SERPL-MCNC: 13 MG/DL (ref 6–20)
CALCIUM SERPL-MCNC: 9.5 MG/DL (ref 8.7–10.5)
CHLORIDE SERPL-SCNC: 104 MMOL/L (ref 95–110)
CO2 SERPL-SCNC: 26 MMOL/L (ref 23–29)
CREAT SERPL-MCNC: 0.9 MG/DL (ref 0.5–1.4)
EST. GFR  (AFRICAN AMERICAN): >60 ML/MIN/1.73 M^2
EST. GFR  (NON AFRICAN AMERICAN): >60 ML/MIN/1.73 M^2
GLUCOSE SERPL-MCNC: 139 MG/DL (ref 70–110)
POTASSIUM SERPL-SCNC: 3.9 MMOL/L (ref 3.5–5.1)
PROT SERPL-MCNC: 9.1 G/DL (ref 6–8.4)
SODIUM SERPL-SCNC: 138 MMOL/L (ref 136–145)

## 2020-07-02 RX ORDER — NAPROXEN 500 MG/1
500 TABLET ORAL 2 TIMES DAILY
COMMUNITY

## 2020-07-02 NOTE — PRE ADMISSION SCREENING
Pre op instructions reviewed with patient per phone:    To confirm, Your surgeon has instructed you:  Surgery is scheduled 7/7/2020 at 1319.      Please report to [facility] 1st floor main lobby by 1145.   Pre admit office to call afternoon prior to surgery with final arrival time    Rapid Covid test to be done in Pre-Op on the morning of surgery.      INSTRUCTIONS IMPORTANT!!!  ¨ Do not eat, drink, or smoke after 12 midnight prior to surgery, including water. OK to brush teeth, no gum, candy or mints!    ¨ Take only these medicines with a small swallow of water-morning of surgery.  Albuterol inhaler, amlodipine, hydralazine, metoprolol          ____   Due to COVID 19 concerns, 1 visitor will be allowed in the pre operative area, and must adhere to social distancing guidelines.  One visitor/family member is currently allowed to visit in-patient rooms from 10:00 a.m - 6:00 p.m    ____   Family/caregivers will be updated re pt status via text/cell phone      ____  Do not wear makeup, including mascara.  ____  No powder, lotions or creams to surgical area.  ____  Please remove all jewelry, including piercings and leave at home.  ____  No money or valuables needed. Please leave at home.  ____  Please bring identification and insurance information to hospital.  ____  If going home the same day, arrange for a ride home. You will not be able to   drive if Anesthesia was used.  ____  Children, under 12 years old, must remain in the waiting room with an adult.  They are not allowed in patient areas.  ____  Wear loose fitting clothing. Allow for dressings, bandages.  ____  Stop Aspirin, Ibuprofen, Motrin and Aleve at least 5-7 days before surgery, unless otherwise instructed by your doctor, or the nurse.   You MAY use Tylenol/acetaminophen until day of surgery.  ____  If you take diabetic medication, do not take am of surgery unless instructed by   Doctor.  ____ Stop taking any Fish Oil supplement or  any Vitamins that contain Vitamin E at least 5 days prior to surgery.          Bathing Instructions-- The night before surgery and the morning prior to coming to the hospital:   -Do not shave the surgical area.   -Shower and wash your hair and body as usual with anti-bacterial  soap and shampoo.   -Rinse your hair and body completely.   -Use one packet of hibiclens to wash the surgical site (using your hand) gently for 5 minutes.  Do not scrub you skin too hard.   -Do not use hibiclens on your head, face, or genitals.   -Do not wash with anti-bacterial soap after you use the hibiclens.   -Rinse your body thoroughly.   -Dry with clean, soft towel.  Do not use lotion, cream, deodorant, or powders on   the surgical site.    Use antibacterial soap in place of hibiclens if your surgery is on the head, face or genitals.         Surgical Site Infection    Prevention of surgical site infections:     -Keep incisions clean and dry.   -Do not soak/submerge incisions in water until completely healed.   -Do not apply lotions, powders, creams, or deodorants to site.   -Always make sure hands are cleaned with antibacterial soap/ alcohol-based   prior to touching the surgical site.  (This includes doctors, nurses, staff, and yourself.)    Signs and symptoms:   -Redness and pain around the area where you had surgery   -Drainage of cloudy fluid from your surgical wound   -Fever over 100.4  I have read or had read and explained to me, and understand the above information.

## 2020-07-07 ENCOUNTER — ANESTHESIA (OUTPATIENT)
Dept: SURGERY | Facility: HOSPITAL | Age: 50
End: 2020-07-07
Payer: COMMERCIAL

## 2020-07-07 ENCOUNTER — HOSPITAL ENCOUNTER (OUTPATIENT)
Dept: RADIOLOGY | Facility: HOSPITAL | Age: 50
Discharge: HOME | End: 2020-07-07
Attending: SURGERY
Payer: COMMERCIAL

## 2020-07-07 ENCOUNTER — ANESTHESIA EVENT (OUTPATIENT)
Dept: SURGERY | Facility: HOSPITAL | Age: 50
End: 2020-07-07
Payer: COMMERCIAL

## 2020-07-07 ENCOUNTER — HOSPITAL ENCOUNTER (OUTPATIENT)
Facility: HOSPITAL | Age: 50
Discharge: HOME | End: 2020-07-07
Attending: SURGERY | Admitting: SURGERY
Payer: COMMERCIAL

## 2020-07-07 DIAGNOSIS — Z01.818 PREOP TESTING: ICD-10-CM

## 2020-07-07 DIAGNOSIS — R59.0 LYMPHADENOPATHY, AXILLARY: ICD-10-CM

## 2020-07-07 LAB
POCT GLUCOSE: 158 MG/DL (ref 70–110)
POCT GLUCOSE: 213 MG/DL (ref 70–110)
SARS-COV-2 RDRP RESP QL NAA+PROBE: NEGATIVE

## 2020-07-07 PROCEDURE — 88365 INSITU HYBRIDIZATION (FISH): CPT | Performed by: PATHOLOGY

## 2020-07-07 PROCEDURE — 87116 MYCOBACTERIA CULTURE: CPT

## 2020-07-07 PROCEDURE — 88189 FLOWCYTOMETRY/READ 16 & >: CPT | Mod: ,,, | Performed by: PATHOLOGY

## 2020-07-07 PROCEDURE — 87206 SMEAR FLUORESCENT/ACID STAI: CPT

## 2020-07-07 PROCEDURE — 88305 TISSUE EXAM BY PATHOLOGIST: ICD-10-PCS | Mod: 26,,, | Performed by: PATHOLOGY

## 2020-07-07 PROCEDURE — 87070 CULTURE OTHR SPECIMN AEROBIC: CPT

## 2020-07-07 PROCEDURE — 88341 IMHCHEM/IMCYTCHM EA ADD ANTB: CPT | Performed by: PATHOLOGY

## 2020-07-07 PROCEDURE — 38525 PR BIOPSY/REM LYMPH NODES, AXILLARY: ICD-10-PCS | Mod: RT,,, | Performed by: SURGERY

## 2020-07-07 PROCEDURE — 87075 CULTR BACTERIA EXCEPT BLOOD: CPT

## 2020-07-07 PROCEDURE — U0002 COVID-19 LAB TEST NON-CDC: HCPCS

## 2020-07-07 PROCEDURE — 88341 IMHCHEM/IMCYTCHM EA ADD ANTB: CPT | Mod: 26,,, | Performed by: PATHOLOGY

## 2020-07-07 PROCEDURE — 36000706: Performed by: SURGERY

## 2020-07-07 PROCEDURE — 63600175 PHARM REV CODE 636 W HCPCS: Performed by: NURSE ANESTHETIST, CERTIFIED REGISTERED

## 2020-07-07 PROCEDURE — 36000707: Performed by: SURGERY

## 2020-07-07 PROCEDURE — 25000003 PHARM REV CODE 250: Performed by: SURGERY

## 2020-07-07 PROCEDURE — 25000003 PHARM REV CODE 250: Performed by: NURSE ANESTHETIST, CERTIFIED REGISTERED

## 2020-07-07 PROCEDURE — 37000009 HC ANESTHESIA EA ADD 15 MINS: Performed by: SURGERY

## 2020-07-07 PROCEDURE — 88341 IMHCHEM/IMCYTCHM EA ADD ANTB: CPT | Mod: 59 | Performed by: PATHOLOGY

## 2020-07-07 PROCEDURE — 88342 CHG IMMUNOCYTOCHEMISTRY: ICD-10-PCS | Mod: 26,59,, | Performed by: PATHOLOGY

## 2020-07-07 PROCEDURE — 88185 FLOWCYTOMETRY/TC ADD-ON: CPT | Performed by: PATHOLOGY

## 2020-07-07 PROCEDURE — 88342 IMHCHEM/IMCYTCHM 1ST ANTB: CPT | Mod: 91 | Performed by: PATHOLOGY

## 2020-07-07 PROCEDURE — 88365 PR  TISSUE HYBRIDIZATION: ICD-10-PCS | Mod: 26,,, | Performed by: PATHOLOGY

## 2020-07-07 PROCEDURE — 81264 IGK REARRANGEABN CLONAL POP: CPT | Performed by: PATHOLOGY

## 2020-07-07 PROCEDURE — 88305 TISSUE EXAM BY PATHOLOGIST: CPT | Performed by: PATHOLOGY

## 2020-07-07 PROCEDURE — 88364 INSITU HYBRIDIZATION (FISH): CPT | Performed by: PATHOLOGY

## 2020-07-07 PROCEDURE — 88189 PR  FLOWCYTOMETRY/READ, 16 & > MARKERS: ICD-10-PCS | Mod: ,,, | Performed by: PATHOLOGY

## 2020-07-07 PROCEDURE — 27201423 OPTIME MED/SURG SUP & DEVICES STERILE SUPPLY: Performed by: SURGERY

## 2020-07-07 PROCEDURE — 87205 SMEAR GRAM STAIN: CPT

## 2020-07-07 PROCEDURE — 76942 ECHO GUIDE FOR BIOPSY: CPT | Mod: TC,59

## 2020-07-07 PROCEDURE — 71000033 HC RECOVERY, INTIAL HOUR: Performed by: SURGERY

## 2020-07-07 PROCEDURE — 88341 PR IHC OR ICC EACH ADD'L SINGLE ANTIBODY  STAINPR: ICD-10-PCS | Mod: 26,,, | Performed by: PATHOLOGY

## 2020-07-07 PROCEDURE — 88365 INSITU HYBRIDIZATION (FISH): CPT | Mod: 26,,, | Performed by: PATHOLOGY

## 2020-07-07 PROCEDURE — 37000008 HC ANESTHESIA 1ST 15 MINUTES: Performed by: SURGERY

## 2020-07-07 PROCEDURE — 88342 IMHCHEM/IMCYTCHM 1ST ANTB: CPT | Mod: 26,59,, | Performed by: PATHOLOGY

## 2020-07-07 PROCEDURE — 88305 TISSUE EXAM BY PATHOLOGIST: CPT | Mod: 26,,, | Performed by: PATHOLOGY

## 2020-07-07 PROCEDURE — 88184 FLOWCYTOMETRY/ TC 1 MARKER: CPT | Performed by: PATHOLOGY

## 2020-07-07 PROCEDURE — 88342 IMHCHEM/IMCYTCHM 1ST ANTB: CPT | Performed by: PATHOLOGY

## 2020-07-07 PROCEDURE — 87102 FUNGUS ISOLATION CULTURE: CPT

## 2020-07-07 PROCEDURE — 63600175 PHARM REV CODE 636 W HCPCS: Performed by: SURGERY

## 2020-07-07 PROCEDURE — 81261 IGH GENE REARRANGE AMP METH: CPT | Performed by: PATHOLOGY

## 2020-07-07 PROCEDURE — 38525 BIOPSY/REMOVAL LYMPH NODES: CPT | Mod: RT,,, | Performed by: SURGERY

## 2020-07-07 RX ORDER — ONDANSETRON 2 MG/ML
INJECTION INTRAMUSCULAR; INTRAVENOUS
Status: DISCONTINUED | OUTPATIENT
Start: 2020-07-07 | End: 2020-07-07

## 2020-07-07 RX ORDER — PROPOFOL 10 MG/ML
VIAL (ML) INTRAVENOUS CONTINUOUS PRN
Status: DISCONTINUED | OUTPATIENT
Start: 2020-07-07 | End: 2020-07-07

## 2020-07-07 RX ORDER — SODIUM CHLORIDE 0.9 % (FLUSH) 0.9 %
3 SYRINGE (ML) INJECTION EVERY 8 HOURS
Status: DISCONTINUED | OUTPATIENT
Start: 2020-07-07 | End: 2020-07-07 | Stop reason: HOSPADM

## 2020-07-07 RX ORDER — KETAMINE HYDROCHLORIDE 50 MG/ML
INJECTION, SOLUTION INTRAMUSCULAR; INTRAVENOUS
Status: DISCONTINUED | OUTPATIENT
Start: 2020-07-07 | End: 2020-07-07

## 2020-07-07 RX ORDER — SODIUM CHLORIDE 9 MG/ML
INJECTION, SOLUTION INTRAVENOUS CONTINUOUS
Status: DISCONTINUED | OUTPATIENT
Start: 2020-07-07 | End: 2020-07-07 | Stop reason: HOSPADM

## 2020-07-07 RX ORDER — LIDOCAINE HYDROCHLORIDE 10 MG/ML
5 INJECTION, SOLUTION EPIDURAL; INFILTRATION; INTRACAUDAL; PERINEURAL ONCE
Status: DISCONTINUED | OUTPATIENT
Start: 2020-07-07 | End: 2020-07-07 | Stop reason: HOSPADM

## 2020-07-07 RX ORDER — MIDAZOLAM HYDROCHLORIDE 1 MG/ML
INJECTION, SOLUTION INTRAMUSCULAR; INTRAVENOUS
Status: DISCONTINUED | OUTPATIENT
Start: 2020-07-07 | End: 2020-07-07

## 2020-07-07 RX ORDER — PROPOFOL 10 MG/ML
VIAL (ML) INTRAVENOUS
Status: DISCONTINUED | OUTPATIENT
Start: 2020-07-07 | End: 2020-07-07

## 2020-07-07 RX ORDER — HYDROMORPHONE HYDROCHLORIDE 2 MG/ML
0.2 INJECTION, SOLUTION INTRAMUSCULAR; INTRAVENOUS; SUBCUTANEOUS EVERY 5 MIN PRN
Status: DISCONTINUED | OUTPATIENT
Start: 2020-07-07 | End: 2020-07-07 | Stop reason: HOSPADM

## 2020-07-07 RX ORDER — HYDROCODONE BITARTRATE AND ACETAMINOPHEN 10; 325 MG/1; MG/1
1 TABLET ORAL EVERY 4 HOURS PRN
Status: DISCONTINUED | OUTPATIENT
Start: 2020-07-07 | End: 2020-07-07 | Stop reason: HOSPADM

## 2020-07-07 RX ORDER — LIDOCAINE HYDROCHLORIDE 10 MG/ML
INJECTION, SOLUTION EPIDURAL; INFILTRATION; INTRACAUDAL; PERINEURAL
Status: DISCONTINUED | OUTPATIENT
Start: 2020-07-07 | End: 2020-07-07 | Stop reason: HOSPADM

## 2020-07-07 RX ORDER — DIPHENHYDRAMINE HYDROCHLORIDE 50 MG/ML
25 INJECTION INTRAMUSCULAR; INTRAVENOUS EVERY 6 HOURS PRN
Status: DISCONTINUED | OUTPATIENT
Start: 2020-07-07 | End: 2020-07-07 | Stop reason: HOSPADM

## 2020-07-07 RX ORDER — CEFAZOLIN SODIUM 2 G/50ML
2 SOLUTION INTRAVENOUS
Status: COMPLETED | OUTPATIENT
Start: 2020-07-07 | End: 2020-07-07

## 2020-07-07 RX ORDER — LIDOCAINE HYDROCHLORIDE 10 MG/ML
1 INJECTION, SOLUTION EPIDURAL; INFILTRATION; INTRACAUDAL; PERINEURAL ONCE
Status: DISCONTINUED | OUTPATIENT
Start: 2020-07-07 | End: 2020-07-07 | Stop reason: HOSPADM

## 2020-07-07 RX ORDER — FENTANYL CITRATE 50 UG/ML
INJECTION, SOLUTION INTRAMUSCULAR; INTRAVENOUS
Status: DISCONTINUED | OUTPATIENT
Start: 2020-07-07 | End: 2020-07-07

## 2020-07-07 RX ORDER — SODIUM CHLORIDE, SODIUM LACTATE, POTASSIUM CHLORIDE, CALCIUM CHLORIDE 600; 310; 30; 20 MG/100ML; MG/100ML; MG/100ML; MG/100ML
INJECTION, SOLUTION INTRAVENOUS CONTINUOUS PRN
Status: DISCONTINUED | OUTPATIENT
Start: 2020-07-07 | End: 2020-07-07

## 2020-07-07 RX ORDER — GLYCOPYRROLATE 0.2 MG/ML
INJECTION INTRAMUSCULAR; INTRAVENOUS
Status: DISCONTINUED | OUTPATIENT
Start: 2020-07-07 | End: 2020-07-07

## 2020-07-07 RX ORDER — BUPIVACAINE HYDROCHLORIDE 2.5 MG/ML
INJECTION, SOLUTION EPIDURAL; INFILTRATION; INTRACAUDAL
Status: DISCONTINUED | OUTPATIENT
Start: 2020-07-07 | End: 2020-07-07 | Stop reason: HOSPADM

## 2020-07-07 RX ORDER — MEPERIDINE HYDROCHLORIDE 25 MG/ML
12.5 INJECTION INTRAMUSCULAR; INTRAVENOUS; SUBCUTANEOUS ONCE AS NEEDED
Status: DISCONTINUED | OUTPATIENT
Start: 2020-07-07 | End: 2020-07-07 | Stop reason: HOSPADM

## 2020-07-07 RX ORDER — METOCLOPRAMIDE HYDROCHLORIDE 5 MG/ML
10 INJECTION INTRAMUSCULAR; INTRAVENOUS EVERY 10 MIN PRN
Status: DISCONTINUED | OUTPATIENT
Start: 2020-07-07 | End: 2020-07-07 | Stop reason: HOSPADM

## 2020-07-07 RX ORDER — HYDROCODONE BITARTRATE AND ACETAMINOPHEN 5; 325 MG/1; MG/1
1 TABLET ORAL EVERY 4 HOURS PRN
Status: DISCONTINUED | OUTPATIENT
Start: 2020-07-07 | End: 2020-07-07 | Stop reason: HOSPADM

## 2020-07-07 RX ORDER — HYDROCODONE BITARTRATE AND ACETAMINOPHEN 5; 325 MG/1; MG/1
1 TABLET ORAL EVERY 4 HOURS PRN
Qty: 20 TABLET | Refills: 0 | Status: SHIPPED | OUTPATIENT
Start: 2020-07-07

## 2020-07-07 RX ORDER — LIDOCAINE HYDROCHLORIDE 10 MG/ML
INJECTION, SOLUTION EPIDURAL; INFILTRATION; INTRACAUDAL; PERINEURAL
Status: DISCONTINUED | OUTPATIENT
Start: 2020-07-07 | End: 2020-07-07

## 2020-07-07 RX ADMIN — FENTANYL CITRATE 25 MCG: 50 INJECTION, SOLUTION INTRAMUSCULAR; INTRAVENOUS at 03:07

## 2020-07-07 RX ADMIN — SODIUM CHLORIDE, SODIUM LACTATE, POTASSIUM CHLORIDE, AND CALCIUM CHLORIDE: 600; 310; 30; 20 INJECTION, SOLUTION INTRAVENOUS at 03:07

## 2020-07-07 RX ADMIN — MIDAZOLAM 2 MG: 1 INJECTION INTRAMUSCULAR; INTRAVENOUS at 03:07

## 2020-07-07 RX ADMIN — HYDROCODONE BITARTRATE AND ACETAMINOPHEN 1 TABLET: 10; 325 TABLET ORAL at 05:07

## 2020-07-07 RX ADMIN — LIDOCAINE HYDROCHLORIDE 50 MG: 10 INJECTION, SOLUTION EPIDURAL; INFILTRATION; INTRACAUDAL; PERINEURAL at 03:07

## 2020-07-07 RX ADMIN — ROBINUL 0.2 MG: 0.2 INJECTION INTRAMUSCULAR; INTRAVENOUS at 03:07

## 2020-07-07 RX ADMIN — FENTANYL CITRATE 50 MCG: 50 INJECTION, SOLUTION INTRAMUSCULAR; INTRAVENOUS at 04:07

## 2020-07-07 RX ADMIN — ONDANSETRON 4 MG: 2 INJECTION, SOLUTION INTRAMUSCULAR; INTRAVENOUS at 03:07

## 2020-07-07 RX ADMIN — PROPOFOL 30 MG: 10 INJECTION, EMULSION INTRAVENOUS at 03:07

## 2020-07-07 RX ADMIN — PROPOFOL 10 MG: 10 INJECTION, EMULSION INTRAVENOUS at 03:07

## 2020-07-07 RX ADMIN — KETAMINE HYDROCHLORIDE 10 MG: 50 INJECTION INTRAMUSCULAR; INTRAVENOUS at 03:07

## 2020-07-07 RX ADMIN — PROPOFOL 20 MG: 10 INJECTION, EMULSION INTRAVENOUS at 04:07

## 2020-07-07 RX ADMIN — PROPOFOL 40 MG: 10 INJECTION, EMULSION INTRAVENOUS at 03:07

## 2020-07-07 RX ADMIN — KETAMINE HYDROCHLORIDE 20 MG: 50 INJECTION INTRAMUSCULAR; INTRAVENOUS at 03:07

## 2020-07-07 RX ADMIN — CEFAZOLIN SODIUM 2 G: 2 SOLUTION INTRAVENOUS at 03:07

## 2020-07-07 RX ADMIN — PROPOFOL 10 MG: 10 INJECTION, EMULSION INTRAVENOUS at 04:07

## 2020-07-07 RX ADMIN — PROPOFOL 50 MCG/KG/MIN: 10 INJECTION, EMULSION INTRAVENOUS at 04:07

## 2020-07-07 RX ADMIN — PROPOFOL 20 MG: 10 INJECTION, EMULSION INTRAVENOUS at 03:07

## 2020-07-07 NOTE — TRANSFER OF CARE
"Anesthesia Transfer of Care Note    Patient: Nu Yañez    Procedure(s) Performed: Procedure(s) (LRB):  BIOPSY, LYMPH NODE, AXILLARY (Right)    Patient location: PACU    Anesthesia Type: MAC    Transport from OR: Transported from OR on room air with adequate spontaneous ventilation    Post pain: adequate analgesia    Post assessment: no apparent anesthetic complications    Post vital signs: stable    Level of consciousness: awake, alert and oriented    Nausea/Vomiting: no nausea/vomiting    Complications: none    Transfer of care protocol was followed      Last vitals:   Visit Vitals  BP (!) 169/86 (BP Location: Right leg, Patient Position: Lying)   Pulse 68   Temp 36.2 °C (97.2 °F) (Temporal)   Resp (!) 25   Ht 5' 5" (1.651 m)   Wt 109 kg (240 lb 4.8 oz)   LMP 06/22/2020 (Approximate)   SpO2 (!) 78%   Breastfeeding No   BMI 39.99 kg/m²     "

## 2020-07-07 NOTE — ANESTHESIA PREPROCEDURE EVALUATION
07/07/2020  Nu Yañez is a 50 y.o., female.    Anesthesia Evaluation    I have reviewed the Patient Summary Reports.    I have reviewed the Nursing Notes.    I have reviewed the Medications.     Review of Systems  Anesthesia Hx:  No problems with previous Anesthesia  History of prior surgery of interest to airway management or planning: Previous anesthesia: General  Denies Personal Hx of Anesthesia complications.   Social:  Non-Smoker    Hematology/Oncology:  Hematology Normal   Oncology Normal     EENT/Dental:EENT/Dental Normal   Cardiovascular:   Hypertension ECG has been reviewed. Echo 1/2020  CONCLUSIONS     1 - Normal left ventricular systolic function (EF 60-65%).     2 - Impaired LV relaxation, elevated LAP (grade 2 diastolic dysfunction).     3 - Right ventricle is upper limit of normal in size with normal systolic function.     4 - Pulmonary hypertension. The estimated PA systolic pressure is 61 mmHg.     5 - No evidence of intracardiac shunt.    Pulmonary:   Sleep Apnea    Renal/:  Renal/ Normal     Hepatic/GI:  Hepatic/GI Normal    Musculoskeletal:  Musculoskeletal Normal    Neurological:  Neurology Normal    Endocrine:   Diabetes    Dermatological:  Skin Normal    Psych:  Psychiatric Normal           Physical Exam  General:  Well nourished, Morbid Obesity    Airway/Jaw/Neck:  Airway Findings: Mouth Opening: Normal Tongue: Normal  Mallampati: III      Dental:  Dental Findings: In tact   Chest/Lungs:  Chest/Lungs Clear    Heart/Vascular:  Heart Findings: Normal Heart murmur: negative       Mental Status:  Mental Status Findings:  Cooperative, Alert and Oriented         Anesthesia Plan  Type of Anesthesia, risks & benefits discussed:  Anesthesia Type:  general, MAC  Patient's Preference:   Intra-op Monitoring Plan: standard ASA monitors  Intra-op Monitoring Plan Comments:   Post Op Pain  Control Plan: multimodal analgesia  Post Op Pain Control Plan Comments:   Induction:   IV  Beta Blocker:  Patient is on a Beta-Blocker and has received one dose within the past 24 hours (No further documentation required).       Informed Consent: Patient understands risks and agrees with Anesthesia plan.  Questions answered. Anesthesia consent signed with patient.  ASA Score: 3     Day of Surgery Review of History & Physical: I have interviewed and examined the patient. I have reviewed the patient's H&P dated:    H&P update referred to the surgeon.         Ready For Surgery From Anesthesia Perspective.

## 2020-07-07 NOTE — PROGRESS NOTES
Needle loc done via ultrasound and performed by dr anne. Lidocaine 1% ordered and placed at bedside to be given by dr anne. Procedure performed using sterile technique and lidocaine admin as local prior to needle insertion.

## 2020-07-07 NOTE — INTERVAL H&P NOTE
The patient has been examined and the H&P has been reviewed:    I concur with the findings and no changes have occurred since H&P was written.    Anesthesia/Surgery risks, benefits and alternative options discussed and understood by patient/family.          Active Hospital Problems    Diagnosis  POA    Lymphadenopathy, axillary [R59.0]  Yes      Resolved Hospital Problems   No resolved problems to display.

## 2020-07-07 NOTE — PLAN OF CARE
Ambulated to preop with crawford noted. Pt sats on room air on arrival 81%. Blood pressure 208/92. After pt relaxed in stretcher approx 20 min later b/p 192/93 and sats on room air 88%. Pt states she wears o2 at home prn. o2 via nasal cannula applied at 2l and o2 sats increased to 96%. preop completed. Awaiting covid results to send to radiology.

## 2020-07-07 NOTE — ANESTHESIA POSTPROCEDURE EVALUATION
Anesthesia Post Evaluation    Patient: Nu Yañez    Procedure(s) Performed: Procedure(s) (LRB):  BIOPSY, LYMPH NODE, AXILLARY (Right)    Final Anesthesia Type: general    Patient location during evaluation: PACU  Patient participation: Yes- Able to Participate  Level of consciousness: awake and alert, oriented and awake  Post-procedure vital signs: reviewed and stable  Pain management: adequate  Airway patency: patent    PONV status at discharge: No PONV  Anesthetic complications: no      Cardiovascular status: blood pressure returned to baseline  Respiratory status: unassisted and spontaneous ventilation  Hydration status: euvolemic  Follow-up not needed.          Vitals Value Taken Time   /99 07/07/20 1745   Temp 36.7 °C (98 °F) 07/07/20 1745   Pulse 64 07/07/20 1747   Resp 15 07/07/20 1747   SpO2 93 % 07/07/20 1747   Vitals shown include unvalidated device data.      Event Time   Out of Recovery 18:06:00         Pain/Con Score: Pain Rating Prior to Med Admin: 5 (7/7/2020  5:45 PM)  Con Score: 9 (7/7/2020  6:15 PM)

## 2020-07-07 NOTE — BRIEF OP NOTE
[facility] -   Brief Operative Note    Surgery Date: 7/7/2020     Surgeon(s) and Role:     * Naif Rico MD - Primary    Assisting Surgeon: None    Pre-op Diagnosis:  Lymphadenopathy, axillary [R59.0]    Post-op Diagnosis:  Post-Op Diagnosis Codes:     * Lymphadenopathy, axillary [R59.0]    Procedure(s) (LRB):  BIOPSY, LYMPH NODE, AXILLARY (Right)    Anesthesia: Local MAC    Description of the findings of the procedure(s):  Right axillary lymph node biopsy    Estimated Blood Loss: 20 mL         Specimens:  Right axillary lymph node    Discharge Note    OUTCOME: Patient tolerated treatment/procedure well without complication and is now ready for discharge.    DISPOSITION: Home or Self Care    FINAL DIAGNOSIS:  Right axillary lymphadenopathy    FOLLOWUP: In clinic    DISCHARGE INSTRUCTIONS:    Discharge Procedure Orders   Diet general     Call MD for:  temperature >100.4     Call MD for:  persistent nausea and vomiting     Call MD for:  severe uncontrolled pain     Call MD for:  difficulty breathing, headache or visual disturbances     Call MD for:  redness, tenderness, or signs of infection (pain, swelling, redness, odor or green/yellow discharge around incision site)     Call MD for:  hives     Call MD for:  persistent dizziness or light-headedness     Call MD for:  extreme fatigue     Remove dressing in 48 hours     Activity as tolerated     Shower on day dressing removed (No bath)

## 2020-07-08 NOTE — OP NOTE
[facility]  Surgery Department  Operative Note    SUMMARY     Date of Procedure: 7/7/2020     Procedure: Procedure(s) (LRB):  BIOPSY, LYMPH NODE, AXILLARY (Right) wire loc     Surgeon(s) and Role:     * Naif Rico MD - Primary    Assisting Surgeon: None    Pre-Operative Diagnosis: Lymphadenopathy, axillary [R59.0]    Post-Operative Diagnosis: Post-Op Diagnosis Codes:     * Lymphadenopathy, axillary [R59.0]    Anesthesia: Local MAC    Technical Procedures Used: right axillary wire loc lymph node biopsy    Description of the Findings of the Procedure: right axillary lymphadenopathy    Complications: No    Estimated Blood Loss (EBL): 10 mL           Implants: * No implants in log *    Specimens:   Right axillary lymph node           Condition: Good    Disposition: PACU - hemodynamically stable.    Procedure in Detail:  The patient was brought to the OR and underwent monitored anesthesia care. Her right axilla was prepped and draped in the usual sterile fashion. Local anesthetic was injected. An incision was made incorporating the wire in the field. Bovie electrocautery was used to dissect down through the clavipectoral fascia to the enlarged lymph node. After identifying the node the harmonic scalpel was used top circumferentially dissect the lymph node free. The node and wire we removed however the wire was noted to be missing a small segment on the proximal portion. Fluoro was used to locate and triangulate the piece of wire. It was then removed and noted wire had been removed in entirety. This was also confirmed on x-ray. The wound was irrigated and hemostasis noted. The incision was closed in layers using 3-0 vicryl for clavipectoral fascia followed by 3-0 vicryl deep dermal and 4-0 monocryl subcuticular. Sterile dressing was applied and the patient transferred to recovery in a stable and satisfactory condition.

## 2020-07-08 NOTE — BRIEF OP NOTE
[facility] -   Brief Operative Note    Surgery Date: 7/7/2020     Surgeon(s) and Role:     * Naif Rico MD - Primary    Assisting Surgeon: None    Pre-op Diagnosis:  Lymphadenopathy, axillary [R59.0]    Post-op Diagnosis:  Post-Op Diagnosis Codes:     * Lymphadenopathy, axillary [R59.0]    Procedure(s) (LRB):  BIOPSY, LYMPH NODE, AXILLARY (Right)    Anesthesia: Local MAC    Description of the findings of the procedure(s): see op note    Estimated Blood Loss: 20 mL         Specimens:   Specimen (12h ago, onward)    None            Discharge Note    OUTCOME: Patient tolerated treatment/procedure well without complication and is now ready for discharge.    DISPOSITION: Home or Self Care    FINAL DIAGNOSIS:  <principal problem not specified>    FOLLOWUP: In clinic    DISCHARGE INSTRUCTIONS:    Discharge Procedure Orders   Diet general     Call MD for:  temperature >100.4     Call MD for:  persistent nausea and vomiting     Call MD for:  severe uncontrolled pain     Call MD for:  difficulty breathing, headache or visual disturbances     Call MD for:  redness, tenderness, or signs of infection (pain, swelling, redness, odor or green/yellow discharge around incision site)     Call MD for:  hives     Call MD for:  persistent dizziness or light-headedness     Call MD for:  extreme fatigue     Remove dressing in 48 hours     Activity as tolerated     Shower on day dressing removed (No bath)        Clinical Reference Documents Added to Patient Instructions       Document    ACETAMINOPHEN; HYDROCODONE TABLETS OR CAPSULES (ENGLISH)    BREAST BIOPSY, SURGICAL: YOUR EXPERIENCE (ENGLISH)

## 2020-07-13 LAB
BACTERIA TISS AEROBE CULT: NO GROWTH
GRAM STN SPEC: NORMAL
GRAM STN SPEC: NORMAL

## 2020-07-15 LAB — BACTERIA SPEC ANAEROBE CULT: NORMAL

## 2020-07-17 ENCOUNTER — TELEPHONE (OUTPATIENT)
Dept: SURGERY | Facility: CLINIC | Age: 50
End: 2020-07-17

## 2020-07-20 ENCOUNTER — OFFICE VISIT (OUTPATIENT)
Dept: SURGERY | Facility: CLINIC | Age: 50
End: 2020-07-20
Payer: COMMERCIAL

## 2020-07-20 VITALS
SYSTOLIC BLOOD PRESSURE: 150 MMHG | TEMPERATURE: 99 F | HEART RATE: 71 BPM | HEIGHT: 65 IN | BODY MASS INDEX: 40.4 KG/M2 | DIASTOLIC BLOOD PRESSURE: 89 MMHG | WEIGHT: 242.5 LBS

## 2020-07-20 DIAGNOSIS — R59.0 LYMPHADENOPATHY, AXILLARY: Primary | ICD-10-CM

## 2020-07-20 PROCEDURE — 99024 PR POST-OP FOLLOW-UP VISIT: ICD-10-PCS | Mod: ,,, | Performed by: SURGERY

## 2020-07-20 PROCEDURE — 99999 PR PBB SHADOW E&M-EST. PATIENT-LVL IV: CPT | Mod: PBBFAC,,, | Performed by: SURGERY

## 2020-07-20 PROCEDURE — 99999 PR PBB SHADOW E&M-EST. PATIENT-LVL IV: ICD-10-PCS | Mod: PBBFAC,,, | Performed by: SURGERY

## 2020-07-20 PROCEDURE — 99024 POSTOP FOLLOW-UP VISIT: CPT | Mod: ,,, | Performed by: SURGERY

## 2020-07-20 PROCEDURE — 99214 OFFICE O/P EST MOD 30 MIN: CPT | Mod: PBBFAC | Performed by: SURGERY

## 2020-07-20 RX ORDER — LOSARTAN POTASSIUM 25 MG/1
25 TABLET ORAL DAILY
COMMUNITY

## 2020-07-20 NOTE — LETTER
July 20, 2020      [facility]  [address] [address]           [facility]  [address]  [place] [address]  Phone: [phone]  Fax: [phone]          Patient: Nu Yañez   MR Number: [id number]   YOB: 1970   Date of Visit: 7/20/2020       Dear [facility]:    Thank you for referring Nu Yañez to me for evaluation. Attached you will find relevant portions of my assessment and plan of care.    If you have questions, please do not hesitate to call me. I look forward to following Nu Yañez along with you.    Sincerely,    Naif Rico MD    Enclosure  CC:  No Recipients    If you would like to receive this communication electronically, please contact [email or web address] or [phone] to request more information on [employer] access.    For providers and/or their staff who would like to refer a patient to [facility], please contact us through our one-stop-shop provider referral line, Dipak Flood, at [phone].    If you feel you have received this communication in error or would no longer like to receive these types of communications, please e-mail [email or web address]

## 2020-07-20 NOTE — PROGRESS NOTES
History & Physical    SUBJECTIVE:     History of Present Illness:  Patient is a 50 y.o. female status post right axillary lymph node biopsy 07/07/2020 presents for postop.  She is doing well with no complaints.    Initially referred for axillary lymph node biopsy.  Patient is undergoing workup for mediastinal/hilar mass thought to be enlarged lymph nodes.  Patient also noted to have PET avid right axillary node.  She previously and went core biopsy which was nondiagnostic.  Denies any fevers chills, weight loss, fatigue or any other changes.    Chief Complaint   Patient presents with    Post-op Evaluation       Review of patient's allergies indicates:  No Known Allergies    Current Outpatient Medications   Medication Sig Dispense Refill    albuterol (PROAIR HFA) 90 mcg/actuation inhaler Inhale 2 puffs into the lungs every 6 (six) hours as needed for Wheezing. Rescue      albuterol (PROVENTIL) 2.5 mg /3 mL (0.083 %) nebulizer solution Take 2.5 mg by nebulization every 6 (six) hours as needed for Wheezing. Rescue      amLODIPine (NORVASC) 5 MG tablet Take 5 mg by mouth once daily.      ferrous sulfate (IRON) 325 mg (65 mg iron) Tab tablet Take 325 mg by mouth 3 (three) times daily.      furosemide (LASIX) 40 MG tablet Take 40 mg by mouth 2 (two) times daily.      HYDROcodone-acetaminophen (NORCO) 5-325 mg per tablet Take 1 tablet by mouth every 4 (four) hours as needed for Pain. 20 tablet 0    insulin glargine (LANTUS U-100 INSULIN) 100 unit/mL injection Inject 15 Units into the skin 2 (two) times daily.      loratadine (CLARITIN) 10 mg tablet Take 10 mg by mouth once daily.      losartan (COZAAR) 25 MG tablet Take 25 mg by mouth once daily.      metFORMIN (GLUCOPHAGE) 850 MG tablet Take 850 mg by mouth 2 (two) times daily with meals.      metoprolol succinate (TOPROL-XL) 100 MG 24 hr tablet Take 100 mg by mouth 2 (two) times daily.      naproxen (NAPROSYN) 500 MG tablet Take 500 mg by mouth 2 (two) times  daily.      potassium chloride SA (K-DUR,KLOR-CON) 20 MEQ tablet Take 20 mEq by mouth once.      traZODone (DESYREL) 100 MG tablet Take 100 mg by mouth every evening.      hydrALAZINE (APRESOLINE) 10 MG tablet Take 10 mg by mouth once daily. 2.5 tablets twice daily       No current facility-administered medications for this visit.        Past Medical History:   Diagnosis Date    CHF (congestive heart failure)     Diabetes     Hypertension      Past Surgical History:   Procedure Laterality Date    BIOPSY  2019    neck    BIOPSY OF AXILLARY NODE Right 7/7/2020    Procedure: BIOPSY, LYMPH NODE, AXILLARY;  Surgeon: Naif Rico MD;  [facility];  Service: General;  Laterality: Right;    FRACTURE SURGERY Left 2009    wrist    TUBAL LIGATION       History reviewed. No pertinent family history.  Social History     Tobacco Use    Smoking status: Never Smoker    Smokeless tobacco: Never Used   Substance Use Topics    Alcohol use: Not Currently     Comment: none 72 hrs prior to surgery    Drug use: Never        Review of Systems:  Review of Systems   Constitutional: Negative for activity change, appetite change, chills, diaphoresis, fatigue, fever and unexpected weight change.   HENT: Negative for congestion, dental problem, rhinorrhea and sore throat.    Eyes: Negative for visual disturbance.   Respiratory: Negative for cough, chest tightness, shortness of breath and wheezing.    Cardiovascular: Negative for chest pain, palpitations and leg swelling.   Gastrointestinal: Negative for abdominal distention, abdominal pain, constipation, diarrhea, nausea and vomiting.   Endocrine: Negative for cold intolerance, heat intolerance, polydipsia, polyphagia and polyuria.   Genitourinary: Negative for difficulty urinating, dysuria, frequency, hematuria and urgency.   Musculoskeletal: Negative for arthralgias, gait problem, myalgias and neck pain.   Skin: Negative for color change, pallor, rash and wound.  "  Neurological: Negative for dizziness, syncope, weakness, light-headedness, numbness and headaches.   Hematological: Negative for adenopathy. Does not bruise/bleed easily.   Psychiatric/Behavioral: Negative for confusion, decreased concentration and sleep disturbance. The patient is not nervous/anxious.        OBJECTIVE:     Vital Signs (Most Recent)  Temp: 98.7 °F (37.1 °C) (07/20/20 1026)  Pulse: 71 (07/20/20 1026)  BP: (!) 150/89 (07/20/20 1026)  5' 5" (1.651 m)  110 kg (242 lb 8.1 oz)     Physical Exam:  Physical Exam  Vitals signs reviewed.   Constitutional:       General: She is not in acute distress.     Appearance: She is well-developed. She is not diaphoretic.   HENT:      Head: Normocephalic and atraumatic.      Right Ear: External ear normal.      Left Ear: External ear normal.   Eyes:      General: No scleral icterus.     Conjunctiva/sclera: Conjunctivae normal.      Pupils: Pupils are equal, round, and reactive to light.   Neck:      Musculoskeletal: Normal range of motion and neck supple.      Thyroid: No thyromegaly.      Trachea: No tracheal deviation.   Cardiovascular:      Rate and Rhythm: Normal rate and regular rhythm.      Heart sounds: Normal heart sounds. No murmur. No friction rub. No gallop.    Pulmonary:      Effort: Pulmonary effort is normal. No respiratory distress.      Breath sounds: Normal breath sounds. No wheezing or rales.   Chest:      Chest wall: No tenderness.      Breasts:         Right: No inverted nipple, mass, nipple discharge, skin change or tenderness.         Left: No inverted nipple, mass, nipple discharge, skin change or tenderness.       Abdominal:      General: Bowel sounds are normal. There is no distension.      Palpations: Abdomen is soft.      Tenderness: There is no abdominal tenderness.      Hernia: No hernia is present.   Musculoskeletal: Normal range of motion.         General: No tenderness or deformity.   Lymphadenopathy:      Cervical: No cervical " adenopathy.   Skin:     General: Skin is warm and dry.      Coloration: Skin is not pale.      Findings: No erythema or rash.   Neurological:      Mental Status: She is alert and oriented to person, place, and time.   Psychiatric:         Behavior: Behavior normal.         Thought Content: Thought content normal.         Judgment: Judgment normal.             Diagnostic Results:  PET-CT:  Impression:  Intrathoracic lymphadenopathy including a prominent right axillary lymph node with max SUV of 5.1.  Additional hypermetabolic mediastinal lymph nodes as above.  This is of uncertain etiology but malignancy should be excluded.  Correlation with sampling can be performed as indicated.  Otherwise, at a minimum, a short-term interval follow-up is suggested.  A posteromedial left upper lung mass is not hypermetabolic and is of uncertain etiology.  Attention on follow-up is suggested.  Left pelvic kidney and other incidental findings as noted above.    CT chest:  1. No evidence of pulmonary embolism.  2. Again demonstrated is bulky lymphadenopathy within the chest.  It is unchanged.  It does not significantly compress vascular are bronchial structures.  Differential considerations include inflammatory disease such as sarcoidosis or neoplasia.  3. Geographic ground-glass opacity within the lungs may relate to air trapping such as with small airways disease.    Final Pathologic Diagnosis RELIAPATH DIAGNOSIS:   LYMPH NODE, RIGHT AXILLA, CORE BIOPSY:   - Minute fragments of lymphoid tissue with increased plasma cells, see   comment.   COMMENT:  Histologic evaluation of this specimen is limited secondary to its   minute nature.  Collections of mature appearing plasma cells are present in a   background of predominantly small lymphocytes.  Increased plasma cell   populations in lymph nodes can be seen with Castleman's disease,   reactive/follicular hyperplasia, certain plasmacytoid lymphomas, syphilis,   and myeloma.  Correlation  with clinical and radiographic studies is   recommended.  Repeat biopsy for flow cytometry is recommended, if clinically   indicated.          ASSESSMENT/PLAN:     49-year-old female status post right axillary lymph node biopsy    PLAN:Plan     Pathology pending  Healing well  Call patient with pathology results once completed and further recommendations

## 2020-07-20 NOTE — LETTER
July 20, 2020      [facility]  [address]  [place] [address]  Phone: [phone]  Fax: [phone]       Patient: Nu Yañez   YOB: 1970  Date of Visit: 07/20/2020    To Whom It May Concern:     Supriya Yañez  was at [facility] on 07/20/2020. She may return to work with no restrictions. If you have any questions or concerns, or if I can be of further assistance, please do not hesitate to contact me.    Sincerely,    Naif Rico MD

## 2020-07-27 ENCOUNTER — PATIENT MESSAGE (OUTPATIENT)
Dept: SURGERY | Facility: CLINIC | Age: 50
End: 2020-07-27

## 2020-07-27 LAB
COMMENT: NORMAL
FINAL PATHOLOGIC DIAGNOSIS: NORMAL
GROSS: NORMAL
Lab: NORMAL
MICROSCOPIC EXAM: NORMAL
SUPPLEMENTAL DIAGNOSIS: NORMAL

## 2020-07-28 VITALS
TEMPERATURE: 98 F | HEIGHT: 65 IN | OXYGEN SATURATION: 95 % | DIASTOLIC BLOOD PRESSURE: 99 MMHG | BODY MASS INDEX: 40.04 KG/M2 | RESPIRATION RATE: 16 BRPM | WEIGHT: 240.31 LBS | HEART RATE: 60 BPM | SYSTOLIC BLOOD PRESSURE: 191 MMHG

## 2020-07-29 ENCOUNTER — TELEPHONE (OUTPATIENT)
Dept: SURGERY | Facility: CLINIC | Age: 50
End: 2020-07-29

## 2020-07-29 ENCOUNTER — DOCUMENTATION ONLY (OUTPATIENT)
Dept: CARDIOTHORACIC SURGERY | Facility: HOSPITAL | Age: 50
End: 2020-07-29

## 2020-07-29 DIAGNOSIS — D47.Z2 CASTLEMAN DISEASE: Primary | ICD-10-CM

## 2020-07-29 DIAGNOSIS — R59.1 LYMPHADENOPATHY: ICD-10-CM

## 2020-07-29 NOTE — TELEPHONE ENCOUNTER
I contacted Ms. Yañez and informed her of the findings from today's multi-disciplinary lung tumor board.  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  It was recommended that a referral be made to Hematology-Oncology in [place].  The large left posterior mediastinal mass has Hounsfield units suggestive of a cystic mass with a large proteinaceous content versus a soft tissue mass.  The density was not PET avid and has not shown any size increase over a 4 month period.  Furthermore, it was concluded that the patient has multiple severe medical comorbidities preclude any attempt at resection.  I informed Ms. Yañez of these findings.  She expressed concerned that nothing would be done to remove the mass and improve her exertional dyspnea.  I stressed that it is highly unlikely that the mass is contributing to exertional dyspnea considering the mass size, location, and absence of any significant compressive affect on the left lung or airway.  I pointed out to her that she has multiple medical comorbidities which may adversely affect her respiratory status.  These include the following:  morbid obesity, obstructive sleep apnea, restrictive lung disease, and pulmonary hypertension.  I informed her that these conditions are more likely to be causative factors of her exertional dyspnea.  I assured her that the Hematology Oncology service would contact her to set up a consultation.

## 2020-07-29 NOTE — PATIENT CARE CONFERENCE
[facility]      THORACIC MULTIDISCIPLINARY TUMOR BOARD  PATIENT REVIEW FORM  ________________________________________________________________________    CLINIC #: [id number]  DATE: 07/29/2020    DIAGNOSIS: Lymphadenopathy      PRESENTER: Dr. Ruiz    PATIENT SUMMARY: 50 year old oxygen dependent female with PMH of obesity, HTN, MARLENA, HFpEF, PAH (PA systolic pressure is 61 mmHg) and DMII presents for evaluation of left lung mass and mediastinal lymphadenopathy. PET CT 1/30/20 showed hypermetabolic right axillary LN and additional mildly avid mediastinal lymphadenopathy. Additionally, it showed a 6.5 x 4.1 cm non-hypermetabolic well-marginated lesion in posteromedial left chest. There is no mass effect nor does it appear to invade surrounding structures. She underwent a US guided biopsy of right axillary LN on 3/13/20. Pathology showed minute fragments of lymphoid tissue with increased plasma cells. Per radiologys recommendation, patient underwent a repeat CT with 100mL of contrast in systemic arterial phase on 5/1/20. Bulky mediastinal, hilar and axillary lymphadenopathy again noted but unchanged in size since January 2020. Again demonstrated is a 41 x 65mm posteromedial left upper lobe mass which abuts pleura and descending thoracic aorta. The patient has multiple significant medical comorbidities which would complicate resection of the left side lung mass versus cyst. 1/14/20- PFTS-FEV1 1.17 47% of predicted and DLCO 10.5 42% of predicted     [place]ed at [facility] on [place]6/20. On CTA chest, lesion in left upper lobe appears most consistent with an additional site of lymphadenopathy. Patient is not a candidate for excisional biopsy via left chest. Recommended excisional biopsy of axillary lymph node to rule out lymphoma. Pathology of excisional biopsy again showed follicles and focal Castleman like changes. Flow cytometry not suggestive of lymphoma.     BOARD RECOMMENDATIONS: On pathology review, diagnosis  consistent with Castleman Disease. Recommend referral to Hematology Oncology for surveillance and treatment recommendations.     CONSULT NEEDED:     [] Surgery    [x] Hem/Onc    [] Rad/Onc    [] Dietary                 [] Social Service    [] Psychology       [] Pulmonology    Clinical Stage: Tumor  Node(s)  Metastasis     Pathologic Stage: Tumor  Node(s)  Metastasis     GROUP STAGE:     [] O    [] 1A    [] IB    [] IIA    [] IIB     [] IIIA     [] IIIB     [] IIIC    [] IV                               [] Local recurrence     [] Regional recurrence     [] Distant recurrence                   [] NSCLC     [] SCLC     Tumor type     Unstageable:      [] Yes     [] No  Metastatic site(s):          [] Jami'l Treatment Guidelines reviewed and care planned is consistent with guidelines.         (i.e., NCCN, NCI, PD, ACO, AUA, etc.)    PRESENTATION AT CANCER CONFERENCE:         [] Prospective    [] Retrospective     [] Follow-Up          [] Eligible for clinical trial

## 2020-07-30 ENCOUNTER — TELEPHONE (OUTPATIENT)
Dept: HEMATOLOGY/ONCOLOGY | Facility: CLINIC | Age: 50
End: 2020-07-30

## 2020-07-30 NOTE — TELEPHONE ENCOUNTER
"Contacted pt regarding message received by nurse navigation with referral to hem/onc from Dr. Grossman and Dr. Ruiz.  Spoke with pt and explained the need for consult.  Discussed locations and patient stated to schedule at "[facility]".  Explained to pt that we are located in the cancer center and she stated that she prior saw Dr. Ruiz here.  Explained to pt that we need to get approval from [facility] prior to appt and that can take 2 weeks.  Patient verbalized understanding and stated that she does not want to come prior to approval.  Verbalized understanding and scheduled pt for 8/14 at 11:20 with Dr. Ch.  Pt requested that someone call her when the approval comes back so that she knows prior to driving here from [place].  Explained to pt that I would let the [facility] rep know that since she will be the one obtaining the referral.  Patient verbalized her appreciation and understanding of all information given.  Message sent to Iris Clarke with appointment date and provider.  Also notified of her request for a call when approved by [facility].  "

## 2020-07-31 ENCOUNTER — TELEPHONE (OUTPATIENT)
Dept: SURGERY | Facility: CLINIC | Age: 50
End: 2020-07-31

## 2020-07-31 NOTE — TELEPHONE ENCOUNTER
----- Message from Leatha Sparks LPN sent at 7/31/2020 11:44 AM CDT -----    ----- Message -----  From: Nevin Ramos  Sent: 7/30/2020  11:25 AM CDT  To: Jacky Disla Staff    Pt is requesting a call back regarding speaking with MsJosrArminda in office. Pt need a copy of pt appt on 07/20. Please call back at [phone]

## 2020-08-05 LAB — FUNGUS SPEC CULT: NORMAL

## 2020-08-13 ENCOUNTER — TELEPHONE (OUTPATIENT)
Dept: HEMATOLOGY/ONCOLOGY | Facility: CLINIC | Age: 50
End: 2020-08-13

## 2020-08-13 NOTE — TELEPHONE ENCOUNTER
Attempted to notify pt that her visit for tomorrow with Dr. Ch is not approved by her insurance company ... no answer on work number, and unable to leave a voice mail on mobile numbers.  Will attemp again in the morning. My [facility] chart message sent

## 2020-08-14 ENCOUNTER — TELEPHONE (OUTPATIENT)
Dept: HEMATOLOGY/ONCOLOGY | Facility: CLINIC | Age: 50
End: 2020-08-14

## 2020-08-14 NOTE — TELEPHONE ENCOUNTER
Multiple attempts made again this am to contact patient as she is not approved for her visit with DR. Ch for this morning.  I have tried all available phone numbers including her sister's number with no answer and no voicemail available.  I have sent a pt portal message yesterday and an email this am with my direct call back number to reschedule this appt. Once approved by insurance. [facility] representative working on currently.

## 2020-08-18 ENCOUNTER — DOCUMENTATION ONLY (OUTPATIENT)
Dept: HEMATOLOGY/ONCOLOGY | Facility: CLINIC | Age: 50
End: 2020-08-18

## 2020-08-18 NOTE — PROGRESS NOTES
Sent msg to Iris Clarke to notify us when VA auth is approved so the patient can be scheduled to see Dr. Ch. Waiting for response, will follow.

## 2020-08-26 ENCOUNTER — TELEPHONE (OUTPATIENT)
Dept: HEMATOLOGY/ONCOLOGY | Facility: CLINIC | Age: 50
End: 2020-08-26

## 2020-08-26 NOTE — TELEPHONE ENCOUNTER
Another message sent to Iris Clarke to investigate the VA auth for this pt to see HEm/ onc so that we can get pt appt set up again as it was not approved.

## 2020-08-26 NOTE — TELEPHONE ENCOUNTER
Attempt made again today to contact pt regarding [facility] approval to proceed with scheduling hem/onc referral visit.  No answer and unable to leave voicemail.  Will try again.

## 2020-08-27 ENCOUNTER — DOCUMENTATION ONLY (OUTPATIENT)
Dept: HEMATOLOGY/ONCOLOGY | Facility: CLINIC | Age: 50
End: 2020-08-27

## 2020-08-27 NOTE — NURSING
Numerous attempts made to contact patient after receiving approval from [facility] for oncology visit.  Today pt family member answered phone and stated pt passed away a few weeks ago. My sympathies extended to him and we have made pt  status in chart with the help of Maura Bob social work team. I have also notified Iris Clarke with [employer] dept.

## 2020-09-09 LAB
ACID FAST MOD KINY STN SPEC: NORMAL
MYCOBACTERIUM SPEC QL CULT: NORMAL
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
## (undated) DEVICE — GLOVE SURGICAL LATEX SZ 7

## (undated) DEVICE — NDL HYPODERMIC BLUNT 18G 1.5IN

## (undated) DEVICE — SEE MEDLINE ITEM 146292

## (undated) DEVICE — SEE MEDLINE ITEM 152622

## (undated) DEVICE — DRESSING TELFA N ADH 3X8

## (undated) DEVICE — UNDERGLOVES BIOGEL PI SZ 7 LF

## (undated) DEVICE — DECANTER VIAL ASEPTIC TRANSFER

## (undated) DEVICE — SUT VICRYL 3-0 27 SH

## (undated) DEVICE — RETRACTOR RADIALUX LIGHTED

## (undated) DEVICE — APPLIER LIGACLIP MED 11IN

## (undated) DEVICE — CONTAINER SPECIMEN STRL 4OZ

## (undated) DEVICE — SHEARS HARMONIC CRVD 9 CM

## (undated) DEVICE — SUPPORT ULNA NERVE PROTECTOR

## (undated) DEVICE — SEE MEDLINE ITEM 157117

## (undated) DEVICE — COVER OVERHEAD SURG LT BLUE

## (undated) DEVICE — APPLICATOR CHLORAPREP ORN 26ML

## (undated) DEVICE — ADHESIVE DERMABOND ADVANCED

## (undated) DEVICE — SEE MEDLINE ITEM 157027

## (undated) DEVICE — MANIFOLD 4 PORT

## (undated) DEVICE — SOL 9P NACL IRR PIC IL

## (undated) DEVICE — EVACUATOR PENCIL SMOKE NEPTUNE

## (undated) DEVICE — ELECTRODE REM PLYHSV RETURN 9

## (undated) DEVICE — POSITIONER HEAD DONUT 9IN FOAM

## (undated) DEVICE — NDL SAFETY 25G X 1.5 ECLIPSE

## (undated) DEVICE — SYR 10CC LUER LOCK

## (undated) DEVICE — DRAPE LAP TIBURON 77X122IN

## (undated) DEVICE — SUT CTD VICRYL 3-0 UND SUTU

## (undated) DEVICE — APPLIER CLIP LIAGCLIP 9.375IN

## (undated) DEVICE — SUT CTD VICRYL 2-0 UND SUTU

## (undated) DEVICE — SUT SILK 2-0 STRANDS 30IN